# Patient Record
Sex: FEMALE | Race: WHITE | NOT HISPANIC OR LATINO | Employment: PART TIME | URBAN - NONMETROPOLITAN AREA
[De-identification: names, ages, dates, MRNs, and addresses within clinical notes are randomized per-mention and may not be internally consistent; named-entity substitution may affect disease eponyms.]

---

## 2017-09-21 ENCOUNTER — OFFICE VISIT - GICH (OUTPATIENT)
Dept: FAMILY MEDICINE | Facility: OTHER | Age: 27
End: 2017-09-21

## 2017-09-21 ENCOUNTER — HOSPITAL ENCOUNTER (OUTPATIENT)
Dept: RADIOLOGY | Facility: OTHER | Age: 27
End: 2017-09-21
Attending: PHYSICIAN ASSISTANT

## 2017-09-21 ENCOUNTER — HISTORY (OUTPATIENT)
Dept: FAMILY MEDICINE | Facility: OTHER | Age: 27
End: 2017-09-21

## 2017-09-21 DIAGNOSIS — M25.562 PAIN IN LEFT KNEE: ICD-10-CM

## 2017-09-21 DIAGNOSIS — M25.561 PAIN IN RIGHT KNEE: ICD-10-CM

## 2017-09-21 DIAGNOSIS — F17.200 NICOTINE DEPENDENCE, UNCOMPLICATED: ICD-10-CM

## 2017-09-21 DIAGNOSIS — B96.89 OTHER SPECIFIED BACTERIAL AGENTS AS THE CAUSE OF DISEASES CLASSIFIED ELSEWHERE: ICD-10-CM

## 2017-09-21 DIAGNOSIS — N89.8 OTHER SPECIFIED NONINFLAMMATORY DISORDERS OF VAGINA (CODE): ICD-10-CM

## 2017-09-21 DIAGNOSIS — N76.0 ACUTE VAGINITIS: ICD-10-CM

## 2017-09-22 ENCOUNTER — COMMUNICATION - GICH (OUTPATIENT)
Dept: FAMILY MEDICINE | Facility: OTHER | Age: 27
End: 2017-09-22

## 2017-09-26 ENCOUNTER — HOSPITAL ENCOUNTER (OUTPATIENT)
Dept: PHYSICAL THERAPY | Facility: OTHER | Age: 27
Setting detail: THERAPIES SERIES
End: 2017-09-26
Attending: PHYSICIAN ASSISTANT

## 2017-09-26 DIAGNOSIS — M25.561 PAIN IN RIGHT KNEE: ICD-10-CM

## 2017-09-26 DIAGNOSIS — M25.562 PAIN IN LEFT KNEE: ICD-10-CM

## 2017-10-09 ENCOUNTER — HOSPITAL ENCOUNTER (OUTPATIENT)
Dept: PHYSICAL THERAPY | Facility: OTHER | Age: 27
Setting detail: THERAPIES SERIES
End: 2017-10-09
Attending: PHYSICIAN ASSISTANT

## 2017-10-13 ENCOUNTER — HOSPITAL ENCOUNTER (OUTPATIENT)
Dept: PHYSICAL THERAPY | Facility: OTHER | Age: 27
Setting detail: THERAPIES SERIES
End: 2017-10-13
Attending: PHYSICIAN ASSISTANT

## 2017-10-16 ENCOUNTER — HOSPITAL ENCOUNTER (OUTPATIENT)
Dept: PHYSICAL THERAPY | Facility: OTHER | Age: 27
Setting detail: THERAPIES SERIES
End: 2017-10-16
Attending: PHYSICIAN ASSISTANT

## 2017-10-23 ENCOUNTER — HOSPITAL ENCOUNTER (OUTPATIENT)
Dept: PHYSICAL THERAPY | Facility: OTHER | Age: 27
Setting detail: THERAPIES SERIES
End: 2017-10-23
Attending: PHYSICIAN ASSISTANT

## 2017-12-27 NOTE — PROGRESS NOTES
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 medicine that reduces the chance of blood clots, bronchodilators, or diabetes medicines.  If you have diabetes: This medicine may affect your blood sugar level and change the amount of insulin or other diabetes medicines you may need. Talk to your healthcare provider about this.  What are the possible side effects?   Along with its needed effects, your medicine may cause some unwanted side effects. Some side effects may be very serious. Some side effects may go away as your body adjusts to the medicine. Tell your healthcare provider if you have any side effects that continue or get worse.  Life-threatening (Report these to your healthcare provider right away. If you cannot reach your healthcare provider right away, get emergency medical care or call 911 for help): Allergic reaction (hives; itching; rash; trouble breathing; tightness in your chest; swelling of your lips, tongue, and throat).  Serious (Report these to your healthcare provider right away.): Signs of nicotine overdose (pale skin, cold sweats, nausea, increased saliva, vomiting, diarrhea, severe headache, dizziness, confusion, weakness, very slow or very fast heartbeat, irregular heartbeat, trouble  breathing, trouble seeing or hearing, severe nervousness, seizures); jaw or tooth pain, severe mouth pain or irritation; mouth blisters.  Other: Sore mouth or throat, change in sense of taste, belching, gas, mild headache, trouble sleeping, hiccups.  What products might interact with this medicine?   When you take this medicine with other medicines, it can change the way this or any of the other medicines work. Nonprescription medicines, vitamins, natural remedies, and certain foods may also interact. Using these products together might cause harmful side effects. Also, when you stop smoking and start using any nicotine replacement products, the dosage of some medicines you may have been taking may need adjustment. Talk to your healthcare provider if you are taking:     Adenosine (Adenocard)    Alpha blockers such as doxazosin (Cardura), prazosin (Minipress), and terazosin    Antianxiety medicines such as chlordiazepoxide, oxazepam, and diazepam (Valium)    Antiarrhythmic medicines (to treat irregular heartbeat) such as flecainide and mexiletine    Antidepressants such as amitriptyline, desipramine (Norpramin), fluvoxamine (Luvox), imipramine (Tofranil), and nortriptyline (Pamelor)    Antipsychotic medicines such as clozapine (Clozaril, FazaClo) and olanzapine (Zyprexa)    Beta blockers such as acebutolol (Sectral), atenolol (Tenormin), carvedilol (Coreg), labetalol (Trandate), metoprolol (Lopressor, Toprol), nadolol (Corgard), pindolol, and sotalol (Betapace, Sorine)    Bupropion (Aplenzin, Forfivo, Wellbutrin, Buproban, Zyban)    Caffeine in food, drinks, or medicines    Cancer medicines such as erlotinib (Tarceva) and irinotecan (Camptosar)    Cimetidine (Tagamet)    Decongestants such as oxymetazoline (Afrin, Dristan), phenylephrine (Ignacio-Synephrine, Sudafed PE), and pseudoephedrine (Sudafed)    Doxepin (Silenor)    Furosemide (Lasix)    Insulin    Medicines to treat breathing or lung problems such as  aminophylline and theophylline    Migraine medicines such as dihydroergotamine (D.H.E. 45, Migranal), ergotamine (Ergomar), and methylergonovine    Other medicines or products to help you stop smoking    Pentazocine (Talwin)    Propranolol (Hemangeol, Inderal, InnoPran)    Varenicline (Chantix)  If you are not sure if your medicines might interact, ask your pharmacist or healthcare provider. Keep a list of all your medicines with you. List all the prescription medicines, nonprescription medicines, supplements, natural remedies, and vitamins that you take. Be sure that you tell all healthcare providers who treat you about all the products you are taking.   How should I store this medicine?   Store this medicine at room temperature. Protect it from heat, high humidity, and bright light.    This advisory includes selected information only and may not include all side effects of this medicine or interactions with other medicines. Ask your healthcare provider or pharmacist for more information or if you have any questions.  Ask your pharmacist for the best way to dispose of outdated medicine or medicine you have not used. Do not throw medicine in the trash.  Keep all medicines out of the reach of children.   Do not share medicines with other people.   Developed by [employer].  Medication Advisor 2016.3 published by [employer].  Last modified: 2016-04-11  Last reviewed: 2015-03-19  This content is reviewed periodically and is subject to change as new health information becomes available. The information is intended to inform and educate and is not a replacement for medical evaluation, advice, diagnosis or treatment by a healthcare professional.  References   Medication Advisor 2016.3 Index    Copyright   2016 [employer], a division of [employer] All rights reserved.           Greater than 25 minutes were spent in counseling and coordination of care.     Courtney Ascencio PA-C..................9/21/2017  3:34 PM

## 2017-12-28 NOTE — PROGRESS NOTES
"Patient Information     Patient Name MRN Sex Tegan Mayorga [id number] Female 1990      Progress Notes by Yusra Carvalho PT at 10/16/2017  1:00 PM     Author:  Yusra Carvalho PT Service:  (none) Author Type:  PT- Physical Therapist     Filed:  10/16/2017  1:45 PM Date of Service:  10/16/2017  1:00 PM Status:  Signed     :  Yusra Carvalho PT (PT- Physical Therapist)            [facility]  Outpatient PT - Daily note    Date of Service: 10/16/2017   Visit #4    PSFS Visit:  4/10  PSFS Completed:  2017     Patient Name: Tegan Antoine   YOB: 1990   Referring MD/Provider: Courtney Ascencio PA-C  Medical and Treatment Diagnosis: Bilateral knee pain  PT Treatment Diagnosis: bilateral patellofemoral pain, hip weakness  Insurance: Other: IM Care  Start of Service: 17  Certification Dates: Start of Service: 17  Medicare/MA Re-Cert Due: 17         Subjective        Patient states knees are feeling better. Leg raises at home getting easier.     Objective    Today's Intervention:    Bike seat 4, level 5 x5 mins  Treadmill 10% incline; side stepping 10% x3 mins each 0.6-0.7 mph    Leg press seat 16, 140# backrest 3, 2 x15  Abduction 60# 2 x15  hamstring curls 80# seat 9 2 x15  Adduction 70# x5, 60# x10    Standing hip flexion, abduction and extension red theraband resistance x15 bilateral   Standing hip hike off 4\" step to fatigue bilateral    Deferred:  Monster walk with green theraband around ankles to fatigue bilateral directions  kinesiotape promoting lateral patellar glide and patellar lift to bilateral knees  Game ready x10 mins bilateral lower extremities, patient supine with bolster under bilateral knees, low compression and 38 degrees   single leg BOSU balance flat surface x1 min bilateral   Supine bridging to fatigue  Seated hip internal rotation with theraband resistance    Plan for next visit: hip strengthening, work toward step " up/down; hip internal rotation/ external rotation strength    Home Exercise Program:    Access Code: [email or web address] URL: [email or web address] Date: 09/26/2017Prepared by: Yusra Carvalho   Exercises   Clamshell - 10-15 reps - 1-2 sets - 5 seconds hold - 1x daily progressing to abduction straight leg raise   Supine Active Straight Leg Raise - 10-15 reps - 1-2 sets - 5 seconds hold - 1x daily       Assessment    Patient demonstrates early fatigue through gluteals and needs cues to prevent compensation patterns due to hip weakness.    Patient will benefit from continued skilled physical therapy services to address aforementioned impairments and return patient to previous level of functioning.      Completed 9/26/2017:  Patient Specific Functional and Pain Scales (PSFS)  Clinician Instructions: Complete after the history and before the exam.   Initial Assessment:   We want to know what 3 activities in your life you are unable to perform, or are having the most difficulty performing, as a result of your chief problem. Please list and score at least 3 activities that you are unable to perform, or having the most difficulty performing, because of your chief problem.   Patient Specific Activity Scoring Scheme (score one number for each activity):   Activity Score (0-10)  0= Unable to perform activity  10= Able to perform activity at same level as before injury or problem   1. Stairs up> down 4/10   2. Kneeling/ pressure on the knee 3/10   3. Getting in/out vehicle 5/10   4.  /10   5.  /10   Totals:  12/30= 40% ability, 60% impairment   Patient verbally states that they understand that the information they have provided above is current and complete to the best of their knowledge.   Patient Specific Functional Scale Modifier Scale Conversion: (patient's modifier that correlates with pt's score on PSFS): 4-CL (60% Impaired).  Initial Primary G Code and Modifier:    Per the Patient's intake and/or assessment the Primary  G Code is: Mobility .   The Patient's Impairment, Limitation or Restriction Modifier would be best described as: CK - 40% - 60% Impairment.   Goal Primary G Code and Modifier:    The Patient's G Code Goal would be: Mobility    The Patient's Impairment, Limitation or Restriction Modifier goal would be best described as: CI - 1% - 20% Impairment.       Goals:  Patient goal:  Decrease knee pain in 8 weeks.    Functional Short Term Goals (4 weeks):   Patient will have improved hip strength to allow straight leg raise without compensation patterns.  Patient will be independent with home exercise program       Functional Long Term Goals (8 weeks):   Patient will negotiate 1 flight of stairs reciprocally with less than 3/10 pain consistently.  Patient will improve hip MMT by at least a half a grade for proper hip and knee mechanics with squatting and lunging.        Plan    Treatment Plan:      Frequency:   16 visits    Duration of Treatment: 8 weeks    Planned Interventions:    Home Exercise Program development  Therapeutic Exercise (ROM & Strengthening)  Therapeutic Activities  Neuromuscular Re-education  Manual Therapy  Ultrasound  E-Stim  Gait Training  Hot Packs / Cold Pack Modalities  Vasopneumatic Compression with Cold Therapy ( Game Ready )  Phonophoresis with Ketoprofen  Iontophoresis with Dexamethasone    Plan for next visit:  Hip strengthening, squatting mechanics, stair analysis    Thank you for your referral to [facility].  Please call with any questions, concerns or comments.  [phone]

## 2017-12-28 NOTE — PROGRESS NOTES
Patient Information     Patient Name MRN Sex Tegan Mayorga [id number] Female 1990      Progress Notes by Yusra Carvalho PT at 10/23/2017  1:04 PM     Author:  Yusra Carvalho PT Service:  (none) Author Type:  PT- Physical Therapist     Filed:  10/23/2017  3:02 PM Date of Service:  10/23/2017  1:04 PM Status:  Signed     :  Yusra Carvalho PT (PT- Physical Therapist)            [facility]  Outpatient PT - Daily note    Date of Service: 10/23/2017   Visit #5    PSFS Visit:  5/10  PSFS Completed:  2017     Patient Name: Tegan Antoine   YOB: 1990   Referring MD/Provider: Courtney Ascencio PA-C  Medical and Treatment Diagnosis: Bilateral knee pain  PT Treatment Diagnosis: bilateral patellofemoral pain, hip weakness  Insurance: Other: IM Care  Start of Service: 17  Certification Dates: Start of Service: 17  Medicare/MA Re-Cert Due: 17         Subjective        Patient states knee pain is a 1/10 today. Feels stiff today. Hasn't been very good about home exercises lately.     Objective    Today's Intervention:    Bike seat 4, level 5 x5 mins- deferred, bike in use  Treadmill 10% incline; side stepping 10% x3 mins each 0.6-0.7 mph    Side lying clams, progressing to straight leg raise in hip abduction bilateral x15    Leg press seat 16, 140# backrest 3, 2 x15- DC due to pain in right quad insertion today.  Abduction 60# 2 x15    Prone hip flexor and quad stretching  Prone hip extension to fatigue bilateral     Supine hamstring and hip stretching. Patellar mobilization all directions x5 mins total    single leg balance x1 min bilateral *    gastroc stretches x30 sec on wedge    Monster walk with green theraband around ankles to fatigue bilateral directions  single leg BOSU balance flat surface x1 min bilateral     Deferred:  Seated hip internal rotation with theraband resistance  kinesiotape promoting lateral patellar glide and patellar lift to  "bilateral knees  Game ready x10 mins bilateral lower extremities, patient supine with bolster under bilateral knees, low compression and 38 degrees   hamstring curls 80# seat 9 2 x15  Adduction 70# x5, 60# x10  Supine bridging to fatigue  Standing hip hike off 4\" step to fatigue bilateral    Plan for next visit: hip strengthening, work toward step up/down; hip internal rotation/ external rotation strength    Home Exercise Program:    Access Code: [email or web address] URL: [email or web address] Date: 10/23/2017 Prepared by: Yusra Carvalho   Exercises   Clamshell - 10-15 reps - 1-2 sets - 5 seconds hold - 1x daily   Sidelying Hip Abduction - 10-15 reps - 1-2 sets - 5 seconds hold - 1x daily   Supine Active Straight Leg Raise - 10-15 reps - 1-2 sets - 5 seconds hold - 1x daily   Standing Hip Abduction with Anchored Resistance - 10-15 reps - 1-2 sets - 5 seconds hold - 1x daily   Standing Hip Extension with Anchored Resistance - 10-15 reps - 1-2 sets - 5 seconds hold - 1x daily   Standing Hip Flexion with Anchored Resistance and Chair Support - 10-15 reps - 1-2 sets - 5 seconds hold - 1x daily   single leg balance x1 min      Assessment    Patient demonstrates early fatigue through gluteals and needs cues to prevent compensation patterns due to hip weakness.    Patient will benefit from continued skilled physical therapy services to address aforementioned impairments and return patient to previous level of functioning.      Completed 9/26/2017:  Patient Specific Functional and Pain Scales (PSFS)  Clinician Instructions: Complete after the history and before the exam.   Initial Assessment:   We want to know what 3 activities in your life you are unable to perform, or are having the most difficulty performing, as a result of your chief problem. Please list and score at least 3 activities that you are unable to perform, or having the most difficulty performing, because of your chief problem.   Patient Specific Activity " Scoring Scheme (score one number for each activity):   Activity Score (0-10)  0= Unable to perform activity  10= Able to perform activity at same level as before injury or problem   1. Stairs up> down 4/10   2. Kneeling/ pressure on the knee 3/10   3. Getting in/out vehicle 5/10   4.  /10   5.  /10   Totals:  12/30= 40% ability, 60% impairment   Patient verbally states that they understand that the information they have provided above is current and complete to the best of their knowledge.   Patient Specific Functional Scale Modifier Scale Conversion: (patient's modifier that correlates with pt's score on PSFS): 4-CL (60% Impaired).  Initial Primary G Code and Modifier:    Per the Patient's intake and/or assessment the Primary G Code is: Mobility .   The Patient's Impairment, Limitation or Restriction Modifier would be best described as: CK - 40% - 60% Impairment.   Goal Primary G Code and Modifier:    The Patient's G Code Goal would be: Mobility    The Patient's Impairment, Limitation or Restriction Modifier goal would be best described as: CI - 1% - 20% Impairment.       Goals:  Patient goal:  Decrease knee pain in 8 weeks.    Functional Short Term Goals (4 weeks):   Patient will have improved hip strength to allow straight leg raise without compensation patterns.  Patient will be independent with home exercise program       Functional Long Term Goals (8 weeks):   Patient will negotiate 1 flight of stairs reciprocally with less than 3/10 pain consistently.  Patient will improve hip MMT by at least a half a grade for proper hip and knee mechanics with squatting and lunging.        Plan    Treatment Plan:      Frequency:   16 visits    Duration of Treatment: 8 weeks    Planned Interventions:    Home Exercise Program development  Therapeutic Exercise (ROM & Strengthening)  Therapeutic Activities  Neuromuscular Re-education  Manual Therapy  Ultrasound  E-Stim  Gait Training  Hot Packs / Cold Pack  Modalities  Vasopneumatic Compression with Cold Therapy ( Game Ready )  Phonophoresis with Ketoprofen  Iontophoresis with Dexamethasone    Plan for next visit:  Hip strengthening, squatting mechanics, stair analysis    Thank you for your referral to [facility].  Please call with any questions, concerns or comments.  [phone]

## 2017-12-28 NOTE — PROGRESS NOTES
"Patient Information     Patient Name MRN Sex Tegan Mayorga [id number] Female 1990      Progress Notes by Yusra Carvalho, PT at 10/13/2017  1:55 PM     Author:  Yusra Carvalho PT Service:  (none) Author Type:  PT- Physical Therapist     Filed:  10/13/2017  2:41 PM Date of Service:  10/13/2017  1:55 PM Status:  Signed     :  Yusra Carvalho PT (PT- Physical Therapist)            [facility]  Outpatient PT - Daily note    Date of Service: 10/13/2017   Visit #3    PSFS Visit:  3/10  PSFS Completed:  2017     Patient Name: Tegan nAtoine   YOB: 1990   Referring MD/Provider: Courtney Ascencio PA-C  Medical and Treatment Diagnosis: Bilateral knee pain  PT Treatment Diagnosis: bilateral patellofemoral pain, hip weakness  Insurance: Other: IM Care  Start of Service: 17  Certification Dates: Start of Service: 17  Medicare/MA Re-Cert Due: 17         Subjective        Rates knee pain \"low\" today, like a 1/10.     Objective    Today's Intervention:    Bike seat 4, level 5 x5 mins  Treadmill 10% incline; side stepping 10% x2.5 mins each 0.6-0.7 mph    Leg press seat 16, 140# backrest 3 2 x15  Abduction 60# 2 x15  hamstring curls 80# seat 9 2 x15    Standing hip hike off 4\" step to fatigue bilateral  single leg BOSU balance flat surface x1 min bilateral     Side lying clams x5, straight leg raise abduction with cues for glut med x15 bilateral     Supine bridging to fatigue    Deferred:  Monster walk with green theraband around ankles to fatigue bilateral directions  kinesiotape promoting lateral patellar glide and patellar lift to bilateral knees  Game ready x10 mins bilateral lower extremities, patient supine with bolster under bilateral knees, low compression and 38 degrees     Plan for next visit: hip strengthening, work toward step up/down; hip internal rotation/ external rotation strength    Home Exercise Program:    Access Code: [email or web address] URL: " [email or web address] Date: 09/26/2017Prepared by: Yusra Carvalho   Exercises   Clamshell - 10-15 reps - 1-2 sets - 5 seconds hold - 1x daily progressing to abduction straight leg raise   Supine Active Straight Leg Raise - 10-15 reps - 1-2 sets - 5 seconds hold - 1x daily       Assessment    Patient demonstrates early fatigue through gluteals and knee pain improves with cues preventing valgus positioning.    Patient will benefit from continued skilled physical therapy services to address aforementioned impairments and return patient to previous level of functioning.      Completed 9/26/2017:  Patient Specific Functional and Pain Scales (PSFS)  Clinician Instructions: Complete after the history and before the exam.   Initial Assessment:   We want to know what 3 activities in your life you are unable to perform, or are having the most difficulty performing, as a result of your chief problem. Please list and score at least 3 activities that you are unable to perform, or having the most difficulty performing, because of your chief problem.   Patient Specific Activity Scoring Scheme (score one number for each activity):   Activity Score (0-10)  0= Unable to perform activity  10= Able to perform activity at same level as before injury or problem   1. Stairs up> down 4/10   2. Kneeling/ pressure on the knee 3/10   3. Getting in/out vehicle 5/10   4.  /10   5.  /10   Totals:  12/30= 40% ability, 60% impairment   Patient verbally states that they understand that the information they have provided above is current and complete to the best of their knowledge.   Patient Specific Functional Scale Modifier Scale Conversion: (patient's modifier that correlates with pt's score on PSFS): 4-CL (60% Impaired).  Initial Primary G Code and Modifier:    Per the Patient's intake and/or assessment the Primary G Code is: Mobility .   The Patient's Impairment, Limitation or Restriction Modifier would be best described as: CK  - 40% - 60% Impairment.   Goal Primary G Code and Modifier:    The Patient's G Code Goal would be: Mobility    The Patient's Impairment, Limitation or Restriction Modifier goal would be best described as: CI - 1% - 20% Impairment.       Goals:  Patient goal:  Decrease knee pain in 8 weeks.    Functional Short Term Goals (4 weeks):   Patient will have improved hip strength to allow straight leg raise without compensation patterns.  Patient will be independent with home exercise program       Functional Long Term Goals (8 weeks):   Patient will negotiate 1 flight of stairs reciprocally with less than 3/10 pain consistently.  Patient will improve hip MMT by at least a half a grade for proper hip and knee mechanics with squatting and lunging.        Plan    Treatment Plan:      Frequency:   16 visits    Duration of Treatment: 8 weeks    Planned Interventions:    Home Exercise Program development  Therapeutic Exercise (ROM & Strengthening)  Therapeutic Activities  Neuromuscular Re-education  Manual Therapy  Ultrasound  E-Stim  Gait Training  Hot Packs / Cold Pack Modalities  Vasopneumatic Compression with Cold Therapy ( Game Ready )  Phonophoresis with Ketoprofen  Iontophoresis with Dexamethasone    Plan for next visit:  Hip strengthening, squatting mechanics, stair analysis    Thank you for your referral to [facility].  Please call with any questions, concerns or comments.  [phone]

## 2017-12-28 NOTE — TELEPHONE ENCOUNTER
Patient Information     Patient Name MRN Sex Tegan Mayorga [id number] Female 1990      Telephone Encounter by Mai Pacheco at 2017 12:48 PM     Author:  Mai Pacheco Service:  (none) Author Type:  (none)     Filed:  2017 12:50 PM Encounter Date:  2017 Status:  Signed     :  Mai Pacheco            Pt called because she missed a call from the clinic. I looked through result notes, and telephone calls and did not see who called the pt. Did you call the pt?

## 2017-12-28 NOTE — TELEPHONE ENCOUNTER
Patient Information     Patient Name MRN Sex Tegan Mayorga [id number] Female 1990      Telephone Encounter by Brionna Vital at 2017  4:10 PM     Author:  Brionna Vital Service:  (none) Author Type:  (none)     Filed:  2017  4:11 PM Encounter Date:  2017 Status:  Signed     :  Brionna Vital            After the patient's name and date of birth was verified, the patient was told the below information.  Brionna Vital LPADELIA..................2017   4:11 PM

## 2017-12-28 NOTE — PROGRESS NOTES
Patient Information     Patient Name MRN Sex Tegan Mayorga [id number] Female 1990      Progress Notes by Yusra Carvalho PT at 10/9/2017  1:06 PM     Author:  Yusra Carvalho PT Service:  (none) Author Type:  PT- Physical Therapist     Filed:  10/9/2017  4:12 PM Date of Service:  10/9/2017  1:06 PM Status:  Signed     :  Yusra Carvalho PT (PT- Physical Therapist)            [facility]  Outpatient PT - Daily note    Date of Service: 10/9/2017   Visit #2    PSFS Visit:  2/10  PSFS Completed:  2017     Patient Name: Tegan Antoine   YOB: 1990   Referring MD/Provider: Courtney Ascencio PA-C  Medical and Treatment Diagnosis: Bilateral knee pain  PT Treatment Diagnosis: bilateral patellofemoral pain, hip weakness  Insurance: Other: [employer]  Start of Service: 17  Certification Dates: Start of Service: 17  Medicare/MA Re-Cert Due: 17         Subjective        Knees are feeling better and tape seemed to help. Rates pain 1-2/10 today.  Works out at [employer] 2x/week (Tues and Th)    Objective    ROM / Strength:                 Norms Left  Right Strength Left  Right   Hip Flexion 120    Hip Flexion 5- 5-   Hip Extension 15   Hip Extension 4+ 4+   Hip Abduction 50   Hip Abduction 4- 4-   Hip External Rot. 60   Hip External Rot.     Hip Internal Rot. 40   Hip Internal Rot.     Knee Extension 0   Knee Extension 5- 5-   Knee Flexion 135   Knee Flexion 4+ 5-   Dorsiflexion 20   Dorsiflexion 5 5   Plantarflexion    Plantarflexion       Observation:  Patient compensates for glut and hip weakness with TFL and QL during side lying clams and straight leg raises    Palpation:  Tender with patellar mobilization and inferolateral border of patella    Gait:  normal    Special Tests:  Patellar compression positive. Worse with medial glide.  Forward knees and valgus with squatting and lunges. Lacks glut firing    Today's Intervention:    Bike seat 4, level 5 x5  "mins  Treadmill 10% incline, retro x3.5 mins; side stepping 10% x2.5 mins each    Wall squats with theraball behind, green theraband around knees to promote lateral patellar tracking. x15  Step ups retro to 4\" step x15 bilateral   Monster walk with green theraband around ankles to fatigue bilateral directions    Side lying clams x5, straight leg raise abduction with cues for glut med x15 bilateral     kinesiotape promoting lateral patellar glide and patellar lift to bilateral knees   Patient was instructed in care of tape. Instructed to remove tape if skin becomes red, itchy, or any other adverse reaction occurs, and wash with soap and water. Tape can be left on for 2-4 days as tolerated and can be worn in the shower. Instructed to gently towel-dry over tape and to never use a hair dryer on tape. Recommended to remove tape the night before, or the morning of, the next physical therapy appointment to allow skin rest time. Patient voiced understanding.    Game ready x10 mins bilateral lower extremities, patient supine with bolster under bilateral knees, low compression and 38 degrees     Plan for next visit: Standing hip abduction/ hip hike/ single leg; [facility]    Home Exercise Program:    Access Code: [email or web address] URL: [email or web address] Date: 09/26/2017Prepared by: Yusra Carvalho   Exercises   Clamshell - 10-15 reps - 1-2 sets - 5 seconds hold - 1x daily   Supine Active Straight Leg Raise - 10-15 reps - 1-2 sets - 5 seconds hold - 1x daily       Assessment    Patient demonstrates tendency to walk/ squat with hips internally rotated and fatigues quickly during session through abductors. Improved knee pain with cues for squatting technique including sitting hips further back and nose over toes.    Patient will benefit from continued skilled physical therapy services to address aforementioned impairments and return patient to previous level of functioning.      Completed 9/26/2017:  Patient Specific Functional " and Pain Scales (PSFS)  Clinician Instructions: Complete after the history and before the exam.   Initial Assessment:   We want to know what 3 activities in your life you are unable to perform, or are having the most difficulty performing, as a result of your chief problem. Please list and score at least 3 activities that you are unable to perform, or having the most difficulty performing, because of your chief problem.   Patient Specific Activity Scoring Scheme (score one number for each activity):   Activity Score (0-10)  0= Unable to perform activity  10= Able to perform activity at same level as before injury or problem   1. Stairs up> down 4/10   2. Kneeling/ pressure on the knee 3/10   3. Getting in/out vehicle 5/10   4.  /10   5.  /10   Totals:  12/30= 40% ability, 60% impairment   Patient verbally states that they understand that the information they have provided above is current and complete to the best of their knowledge.   Patient Specific Functional Scale Modifier Scale Conversion: (patient's modifier that correlates with pt's score on PSFS): 4-CL (60% Impaired).  Initial Primary G Code and Modifier:    Per the Patient's intake and/or assessment the Primary G Code is: Mobility .   The Patient's Impairment, Limitation or Restriction Modifier would be best described as: CK - 40% - 60% Impairment.   Goal Primary G Code and Modifier:    The Patient's G Code Goal would be: Mobility    The Patient's Impairment, Limitation or Restriction Modifier goal would be best described as: CI - 1% - 20% Impairment.       Goals:  Patient goal:  Decrease knee pain in 8 weeks.    Functional Short Term Goals (4 weeks):   Patient will have improved hip strength to allow straight leg raise without compensation patterns.  Patient will be independent with home exercise program       Functional Long Term Goals (8 weeks):   Patient will negotiate 1 flight of stairs reciprocally with less than 3/10 pain  consistently.  Patient will improve hip MMT by at least a half a grade for proper hip and knee mechanics with squatting and lunging.        Plan    Treatment Plan:      Frequency:   16 visits    Duration of Treatment: 8 weeks    Planned Interventions:    Home Exercise Program development  Therapeutic Exercise (ROM & Strengthening)  Therapeutic Activities  Neuromuscular Re-education  Manual Therapy  Ultrasound  E-Stim  Gait Training  Hot Packs / Cold Pack Modalities  Vasopneumatic Compression with Cold Therapy ( Game Ready )  Phonophoresis with Ketoprofen  Iontophoresis with Dexamethasone    Plan for next visit:  Hip strengthening, squatting mechanics, stair analysis    Thank you for your referral to [facility].  Please call with any questions, concerns or comments.  [phone]

## 2017-12-28 NOTE — TELEPHONE ENCOUNTER
Patient Information     Patient Name MRN Tegan Larry [id number] Female 1990      Telephone Encounter by Courtney Ascencio PA-C at 2017  4:08 PM     Author:  Courtney Ascencio PA-C Service:  (none) Author Type:  PHYS- Physician Assistant     Filed:  2017  4:09 PM Encounter Date:  2017 Status:  Signed     :  Courtney Ascencio PA-C (PHYS- Physician Assistant)            No.  She has nicorette gum and metronidazole to treat her bacterial vaginosis at the pharmacy.  Courtney Ascencio PA-C ....................  2017   4:08 PM

## 2017-12-29 NOTE — PATIENT INSTRUCTIONS
Patient Information     Patient Name MRN Tegan Larry [id number] Female 1990      Patient Instructions by Courtney Ascencio PA-C at 2017  4:20 PM     Author:  Courtney Ascencio PA-C  Service:  (none) Author Type:  PHYS- Physician Assistant     Filed:  2017  4:20 PM  Encounter Date:  2017 Status:  Addendum     :  Courtney Ascencio PA-C (PHYS- Physician Assistant)        Related Notes: Original Note by Courtney Ascencio PA-C (PHYS- Physician Assistant) filed at 2017  4:17 PM            Bacterial vaginosis - Given metronidazole for treatment.  Do NOT drink alcohol while taking the medication.  Return in 1-2 weeks with persistent symptoms after treatment as needed.    Knee pain:   Encouraged to take ibuprofen for relief up to 4 times per day.  Encouraged rest and elevation.  Encouraged to use ice or heat 15 minutes at a time several times per day to decrease pain. Return to clinic in 1-2 weeks as necessary for persistent pain. Return to clinic with any change or worsening of symptoms.  Referred to physical therapy.        Index [place] Related topics   Vaginal Bacteria Overgrowth (Bacterial Vaginosis)   ________________________________________________________________________  KEY POINTS    Bacterial vaginosis (BV) is an overgrowth of a certain type of bacteria in the vagina (birth canal). It is a common condition that may or may not cause symptoms.    Untreated BV may go away on its own. If BV is causing itching, pain, or other problems, you may need antibiotic medicine.    Ask your healthcare provider if there are activities you should avoid and when you can return to your normal activities.  ________________________________________________________________________  What is bacterial vaginosis?  Bacterial vaginosis (BV) is an overgrowth of a certain type of bacteria in the vagina (birth canal). It is a common condition that may or may not cause symptoms.   What is the  cause?  It s normal to have some bacteria in the vagina, but sometimes there are too many of certain types of bacteria. Doctors don t know what causes this imbalance of bacteria. One possible cause is douching (cleaning out the vagina with water or other fluids).  Most cases of bacterial vaginosis happen in sexually active women. Women who have more than 1 sexual partner have a greater risk of this problem. However, women who are not sexually active can also have vaginosis. Smoking cigarettes also increases the risk.  What are the symptoms?  Many women don t have any symptoms. When women do have symptoms, the most common symptom is a discharge from the vagina. The discharge may be gray or yellowish and smell bad. For example, it may smell fishy, especially after sex. You may also have itching around the opening of the vagina. Sometimes BV can cause pain or burning in the vagina that does not go away.  How is it diagnosed?  Your healthcare provider will ask about your symptoms and medical history. You will have a pelvic exam, and your provider will get a sample of vaginal discharge for lab tests.  How is it treated?   Untreated bacterial vaginosis sometimes goes away on its own. Sometimes, if you scratch the area to relieve itching, you may get an infection. Rarely, it may cause vaginal pain that keeps bothering you. If bacterial vaginosis is causing itching, pain, or other problems, it may need to be treated with antibiotics. The medicine for bacterial vaginosis may be taken by mouth or it may be a cream or gel that you put into the vagina. The symptoms usually go away within a few days after you start treatment.   How can I take care of myself?  Follow your healthcare provider's instructions. Ask your provider:    How and when you will get your test results    How long it will take to recover    If there are activities you should avoid and when you can return to your normal activities    How to take care of  yourself at home    What symptoms or problems you should watch for and what to do if you have them  Make sure you know when you should come back for a checkup. Keep all appointments for provider visits or tests.  How can I help prevent bacterial vaginosis?    Use latex or polyurethane condoms during foreplay and every time you have vaginal, oral, or anal sex.    Have just 1 sexual partner who is not having sex with anyone else.    If you have had sex and are worried that you may have been infected, see your healthcare provider even if you don t have any symptoms.    Do not douche.  Developed by [employer].  Adult Advisor 2016.3 published by [employer].  Last modified: 2016-03-23  Last reviewed: 2015-11-02  This content is reviewed periodically and is subject to change as new health information becomes available. The information is intended to inform and educate and is not a replacement for medical evaluation, advice, diagnosis or treatment by a healthcare professional.  References   Adult Advisor 2016.3 Index    Copyright   2016 [employer], a division of [employer] All rights reserved.       [facility]: Quitting Tobacco  Thousands of People Have Kicked the Habit and With a Little Motivation and Planning. So Can You!     Congratulations!  Reading this information is the first step to stopping tobacco use because it shows you re ready to consider quitting.  Are You Kidding Yourself?  I don t smoke enough to get the diseases that smoking causes.  Smoking even one cigarette each day results in:           an increased heart rate, blood pressure and narrowing of major blood vessels, causing the heart to work harder           a decreased oxygen supply in the bloodstream, resulting in shortness of breath and lack of oxygen           faster blood clotting, resulting in an increased risk of heart attack, stroke and circulatory problems           increased risk of emphysema.     I don t have to worry  about my health. I smoke low-tar or  organic  cigarettes.  There are no  safe cigarettes.  Low-tar cigarettes often produce higher levels of chemicals like carbon monoxide than high-tar cigarettes. Even organic cigarettes contain toxic chemicals that are part of the tobacco leaf.  Smoking relaxes me.  Smoking is just a temporary relief from the tension caused by your need for nicotine. Smoking actually increases your heart rate and blood pressure.  I ve smoked so long that it won t make any difference if I quit now.  Wrong. Research has proven that your body benefits from quitting, no matter how long you ve been smoking. Your body can even repair some of the damage that has been done. The extra oxygen also helps your body heal faster from injuries or illness.  It s too hard for me to stop smoking.  About 1.5 million Americans quit smoking each year. Each day you do not smoke, your desire will become less and finally disappear.  Preparing To Quit           Make a pact with yourself to quit.           Instead of looking at quitting as success or failure, remember that every effort to quit is another practice at living your life without cigarettes.           Write down your three most important reasons for quitting on a card. Carry the card with you or post it on your refrigerator, desk or mirror and look at it several times a day.           Start reducing your smoking by using distractions or other coping methods.           Making your home smoke free can start reducing your triggers to smoke. For example, most people don t expect to smoke in a restaurant anymore because they ve grown used to it.           Visualize yourself as someone who doesn t use tobacco.           Plan your reward for each day you don t smoke. Keep them small, easy, and affordable. And above all -- do them!  Actually Quitting           Get rid of all cigarettes and chewing tobacco.           Throw away your ashtrays and lighters.           Don t  allow smoking in your home -- if other family members want to smoke, ask them to go outside.           Avoid  high risk  situations for a while: bars, parties and smoking environments.           Think positively. Believe you can quit and your body will remember what it was like before you started using tobacco. Your body was born to live without tobacco.           Take it one day at a time. If you fall off the wagon, climb back on! Remember that most cravings last only 3 to 5 minutes   so wait it out! The craving will go away whether you smoke or not!  Quitting Aids  Talk with your doctor about which way(s) to quit may help you the most.           Over-the-counter products include nicotine gum, nicotine lozenge and the nicotine patch.           Prescription products include Chantix , Zyban , nicotine nasal spray, nicotine inhaler.  Quitting Without Aids  Other ways to quit include hypnosis, acupuncture, delaying the first cigarette of the day, cutting down, and quitting cold turkey.  Need Help?           Call [employer] Class Registration at [phone] or visit [email or web address] to learn about support groups.           Call [employer]  Services at [phone] or visit [employer].    2016 [employer]. TM - A TRADEMARK OF [employer]  OTHER TRADEMARKS USED ARE OWNED BY THEIR RESPECTIVE OWNERS  THIS BROCHURE DOES NOT REPLACE MEDICAL OR PROFESSIONAL ADVICE; IT IS ONLY A GUIDE  [id number] (2/16)   [employer]: Tips and Coping Skills for Quitting Smoking     Getting Started           Make a list of reasons for quitting.           Think positively.  -   Believe you can.  -   Remind yourself,  I don t do that anymore.   -   Tell yourself often:  I can do this.   -   Visualize yourself as someone who doesn t use tobacco.           Use relaxation breathing.  -   Inhale to count of eight.  -   Hold to count of four.  -   Exhale to count of eight.           Substitute items for  cigarettes.  -   Chew gum.  -   Suck on hard candy.  -   Chew on straws or toothpicks.  -   Eat low-calorie snacks.           Keep your hands busy.  -   Play cards.  -   Read books.  -   Put together puzzles.  -   Play with rubber binders.  -   Make crafts.  -   Write letters.  -   Draw.  -   Paint.           Concentrate on the good things in your life!           Change your environment:  -   Change your routine to help avoid temptation. Even small changes can lower the craving to smoke.  -   Get rid of all cigarettes, ashtrays and lighters in your home, car, desk or office.  -   Change your favorite smoking areas to make them remind you less of smoking.  -   Make your home and vehicles smoke free.           Get support from others:  -   Talk to your family, friends or coworkers about how to support you while you quit.  -   See if others you know would like to quit with you. This way you can support each other through the tougher times of quitting.           Plan your reward for each day you don t smoke.           Remember that even the most intense craving lasts only 5 to 10 minutes. Wait it out. Tell yourself,  This too shall pass.   Avoiding a Relapse           Have a plan for how you will deal with unexpected urges. (Take a walk, make a call.)           Think your way through difficult situations ahead of time whenever you can.           Think about past quitting attempts and what was helpful to you. Reuse them again if possible or try something new.           Explore ways to move your body with safe and realistic expectations. Increasing your physical activity can help you manage weight gain and work through emotions that otherwise would make you want to smoke.           Avoid foods high in calories and fat. Sugar can increase cravings to smoke.  Limit large amounts of sugar.           Drink lots of water. Ice water may be helpful in getting rid of a craving.           Reward yourself when you reach milestones:  1 day, 1 week, 2 weeks, 1 month, etc.           Go to places where you cannot smoke -- stay away from the places you used to smoke.           Think about the money you saved!           Think of quitting as an act of love -- for those you care about and for yourself!  Quitting Smoking  For help to quit smoking:           Call [employer] Class Registration at [phone] or visit [email or web address] to learn about support groups.           Call [employer]  Services at [phone] or visit [employer].       2016 [employer]. TM - A TRADEMARK OF [employer]  OTHER TRADEMARKS USED ARE OWNED BY THEIR RESPECTIVE OWNERS  THIS FACT SHEET DOES NOT REPLACE MEDICAL OR PROFESSIONAL ADVICE; IT IS ONLY A GUIDE.  [id number] (2/16)       Index Citizen of [place]   Nicotine, Oral   iván-oh-TEEN  ________________________________________________________________________  KEY POINTS    This medicine is a chewing gum used to help you stop smoking. Use it exactly as directed.    This medicine may cause unwanted side effects. Tell your healthcare provider if you have any side effects that are serious, continue, or get worse.    Tell all healthcare providers who treat you about all the prescription medicines, nonprescription medicines, supplements, natural remedies, and vitamins that you take.  ________________________________________________________________________  What are other names for this medicine?   Type of medicine: smoking deterrent  Generic and brand names: nicotine polacrilex, gum; NICOrelief; Nicorette Gum  What is this medicine used for?  This medicine is a chewing gum used to help you stop smoking. The nicotine in the gum is absorbed into the body through skin in the mouth and lessens the urge to smoke.  This medicine may be used to treat other conditions as determined by your healthcare provider.  What should my healthcare provider know before I take this medicine?   Before taking this medicine, tell your  healthcare provider if you have ever had:    An allergic reaction to any medicine    Asthma or other breathing problems that you take medicine to control    Dental problems, especially with your gums or jaws    Depression that you take medicine to control    Diabetes    Gastric reflux disease (GERD)    Heart disease or a recent heart attack    High blood pressure    Liver or kidney disease    Pheochromocytoma (tumor of the adrenal gland)    Seizures    Thyroid problems    Ulcers  Tell your healthcare provider if you continue to smoke, chew tobacco, use snuff, or use other products that contain nicotine. Also, tell your healthcare provider if you are on a low-sodium diet.  Females of childbearing age: Tell your healthcare provider if you are pregnant or breast-feeding. Nicotine from any source can harm the baby. Do not become pregnant while using this medicine. If you become pregnant, contact your healthcare provider. This treatment is not recommended during pregnancy or while breast-feeding. If you are pregnant or breast-feeding, do not use this medicine unless your healthcare provider approves. Smoking can seriously harm your child. Try to stop smoking without using any nicotine replacement medicine.  How do I use it?   If you have tried to stop smoking using another form of nicotine replacement therapy and have not had any success, ask your healthcare provider if this medicine is right for you. Take this medicine exactly as your healthcare provider prescribes. If your healthcare provider has not given you specific instructions, follow the directions that come with the medicine package. Do not take more or take it longer than prescribed. Ask your healthcare provider or pharmacist about anything you do not understand.   Begin using this medicine on your quit day. Check the label on the medicine for directions about your specific dose. This medicine comes in 2 strengths. The dose is based upon how soon you would  normally smoke after waking up in the morning.   Check with your healthcare provider before using this medicine in children under age 18.   When you feel like smoking, chew 1 piece of gum slowly until you feel a slight tingling sensation or peppery taste in your mouth. Stop chewing and place the gum between your teeth and cheek until the tingling is gone. Then chew the gum again until the tingling comes back. Start and stop in this way for about 30 minutes, until most of the tingling is gone. You must chew slowly to get the full benefit from this medicine. If you still feel a craving to smoke even after chewing a piece of gum, you may chew a second piece within the hour. Do not chew more than 2 pieces in a row, since this may cause more side effects such as hiccups, heartburn, nausea, or other side effects.   Do not use more than 24 pieces of gum in 1 day, or as instructed by your healthcare provider.  Do not eat or drink for 15 minutes before using the gum, or while you are chewing it. Food and drink can keep the nicotine from being absorbed.  If the gum sticks to your dentures, ask your healthcare provider or dentist what to do.  If you get blisters in your mouth, stop using the medicine and contact your healthcare provider.  What if I overdose?  If you or anyone else has intentionally taken too much of this medicine, call 911 or go to the emergency room right away. If you pass out, have seizures, weakness or confusion, or have trouble breathing, call 911. If you think that you or anyone else may have taken too much of this medicine, call the poison control center. Do this even if there are no signs of discomfort or poisoning. The poison control center number is [phone].  Symptoms of an acute overdose may include: nausea, vomiting, diarrhea, stomach pain, cold sweat, headache, dizziness, trouble seeing or hearing, confusion, restlessness, fast or irregular heartbeat, chest pain, drooling, seizures,  coma.  What should I watch out for?   Caution: Nicotine can poison children and pets. Keep used and unused nicotine in a safe place out of the reach of children. Wrap used pieces of gum in paper and dispose of the medicine safely.  Stop using this medicine and contact your healthcare provider if you have:    Mouth, teeth or jaw problems    Fast or irregular heartbeat    Symptoms of nicotine overdose such as nausea, vomiting, dizziness, diarrhea, weakness, and fast heartbeat  If you need emergency care, surgery, lab tests, or dental work, tell the healthcare provider or dentist you are using this medicine.  When you stop smoking, there may be a change in how certain medicines work for you. Talk to your healthcare provider if you are taking medicine that reduces the chance of blood clots, bronchodilators, or diabetes medicines.  If you have diabetes: This medicine may affect your blood sugar level and change the amount of insulin or other diabetes medicines you may need. Talk to your healthcare provider about this.  What are the possible side effects?   Along with its needed effects, your medicine may cause some unwanted side effects. Some side effects may be very serious. Some side effects may go away as your body adjusts to the medicine. Tell your healthcare provider if you have any side effects that continue or get worse.  Life-threatening (Report these to your healthcare provider right away. If you cannot reach your healthcare provider right away, get emergency medical care or call 911 for help): Allergic reaction (hives; itching; rash; trouble breathing; tightness in your chest; swelling of your lips, tongue, and throat).  Serious (Report these to your healthcare provider right away.): Signs of nicotine overdose (pale skin, cold sweats, nausea, increased saliva, vomiting, diarrhea, severe headache, dizziness, confusion, weakness, very slow or very fast heartbeat, irregular heartbeat, trouble breathing, trouble  seeing or hearing, severe nervousness, seizures); jaw or tooth pain, severe mouth pain or irritation; mouth blisters.  Other: Sore mouth or throat, change in sense of taste, belching, gas, mild headache, trouble sleeping, hiccups.  What products might interact with this medicine?   When you take this medicine with other medicines, it can change the way this or any of the other medicines work. Nonprescription medicines, vitamins, natural remedies, and certain foods may also interact. Using these products together might cause harmful side effects. Also, when you stop smoking and start using any nicotine replacement products, the dosage of some medicines you may have been taking may need adjustment. Talk to your healthcare provider if you are taking:     Adenosine (Adenocard)    Alpha blockers such as doxazosin (Cardura), prazosin (Minipress), and terazosin    Antianxiety medicines such as chlordiazepoxide, oxazepam, and diazepam (Valium)    Antiarrhythmic medicines (to treat irregular heartbeat) such as flecainide and mexiletine    Antidepressants such as amitriptyline, desipramine (Norpramin), fluvoxamine (Luvox), imipramine (Tofranil), and nortriptyline (Pamelor)    Antipsychotic medicines such as clozapine (Clozaril, FazaClo) and olanzapine (Zyprexa)    Beta blockers such as acebutolol (Sectral), atenolol (Tenormin), carvedilol (Coreg), labetalol (Trandate), metoprolol (Lopressor, Toprol), nadolol (Corgard), pindolol, and sotalol (Betapace, Sorine)    Bupropion (Aplenzin, Forfivo, Wellbutrin, Buproban, Zyban)    Caffeine in food, drinks, or medicines    Cancer medicines such as erlotinib (Tarceva) and irinotecan (Camptosar)    Cimetidine (Tagamet)    Decongestants such as oxymetazoline (Afrin, Dristan), phenylephrine (Ignacio-Synephrine, Sudafed PE), and pseudoephedrine (Sudafed)    Doxepin (Silenor)    Furosemide (Lasix)    Insulin    Medicines to treat breathing or lung problems such as aminophylline and  theophylline    Migraine medicines such as dihydroergotamine (D.H.[address]. [address], Migranal), ergotamine (Ergomar), and methylergonovine    Other medicines or products to help you stop smoking    Pentazocine (Talwin)    Propranolol (Hemangeol, Inderal, InnoPran)    Varenicline (Chantix)  If you are not sure if your medicines might interact, ask your pharmacist or healthcare provider. Keep a list of all your medicines with you. List all the prescription medicines, nonprescription medicines, supplements, natural remedies, and vitamins that you take. Be sure that you tell all healthcare providers who treat you about all the products you are taking.   How should I store this medicine?   Store this medicine at room temperature. Protect it from heat, high humidity, and bright light.    This advisory includes selected information only and may not include all side effects of this medicine or interactions with other medicines. Ask your healthcare provider or pharmacist for more information or if you have any questions.  Ask your pharmacist for the best way to dispose of outdated medicine or medicine you have not used. Do not throw medicine in the trash.  Keep all medicines out of the reach of children.   Do not share medicines with other people.   Developed by [employer].  Medication Advisor 2016.3 published by [employer].  Last modified: 2016-04-11  Last reviewed: 2015-03-19  This content is reviewed periodically and is subject to change as new health information becomes available. The information is intended to inform and educate and is not a replacement for medical evaluation, advice, diagnosis or treatment by a healthcare professional.  References   Medication Advisor 2016.3 Index    Copyright   2016 [employer], a division of [employer] All rights reserved.

## 2017-12-30 NOTE — NURSING NOTE
Patient Information     Patient Name MRN Tegan Larry [id number] Female 1990      Nursing Note by Suri Morel at 2017  3:00 PM     Author:  Suri Morel Service:  (none) Author Type:  (none)     Filed:  2017  3:31 PM Encounter Date:  2017 Status:  Signed     :  Suri Morel            Here today for a few issues.  She has pain in her knees daily.  She also thinks she may have a vaginal yeast infection.  She has itching, white cheesy discharge.  Also she would like to quit smoking.  Suri Morel LPN..........2017  3:28 PM'
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
## 2018-01-22 ENCOUNTER — OFFICE VISIT - GICH (OUTPATIENT)
Dept: FAMILY MEDICINE | Facility: OTHER | Age: 28
End: 2018-01-22

## 2018-01-22 ENCOUNTER — HISTORY (OUTPATIENT)
Dept: FAMILY MEDICINE | Facility: OTHER | Age: 28
End: 2018-01-22

## 2018-01-22 DIAGNOSIS — J45.20 MILD INTERMITTENT ASTHMA, UNCOMPLICATED: ICD-10-CM

## 2018-01-22 DIAGNOSIS — Z71.89 OTHER SPECIFIED COUNSELING: Chronic | ICD-10-CM

## 2018-01-22 ASSESSMENT — PATIENT HEALTH QUESTIONNAIRE - PHQ9: SUM OF ALL RESPONSES TO PHQ QUESTIONS 1-9: 1

## 2018-01-26 VITALS
DIASTOLIC BLOOD PRESSURE: 64 MMHG | BODY MASS INDEX: 33.11 KG/M2 | SYSTOLIC BLOOD PRESSURE: 104 MMHG | TEMPERATURE: 97.8 F | WEIGHT: 175.25 LBS

## 2018-01-31 ASSESSMENT — PATIENT HEALTH QUESTIONNAIRE - PHQ9: SUM OF ALL RESPONSES TO PHQ QUESTIONS 1-9: 10

## 2018-02-09 VITALS
WEIGHT: 186 LBS | HEIGHT: 62 IN | SYSTOLIC BLOOD PRESSURE: 106 MMHG | HEART RATE: 72 BPM | DIASTOLIC BLOOD PRESSURE: 58 MMHG | BODY MASS INDEX: 34.23 KG/M2

## 2018-02-10 ASSESSMENT — PATIENT HEALTH QUESTIONNAIRE - PHQ9: SUM OF ALL RESPONSES TO PHQ QUESTIONS 1-9: 1
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
## 2018-02-13 NOTE — NURSING NOTE
Patient Information     Patient Name MRTegan Zapata [id number] Female 1990      Nursing Note by Maci Kan at 2018 11:15 AM     Author:  Maci Kan Service:  (none) Author Type:  (none)     Filed:  2018 11:37 AM Encounter Date:  2018 Status:  Signed     :  Maci Kan            Patient presents to the clinic today to discuss getting medications for traveling.    Maci Kan LPN.................. 2018 11:28 AM

## 2018-02-13 NOTE — DISCHARGE SUMMARY
Patient Information     Patient Name MRN Sex Tegan Mayorga [id number] Female 1990      Discharge Summaries by Yusra Carvalho PT at 2018 12:55 PM     Author:  Yusra Carvalho PT Service:  (none) Author Type:  PT- Physical Therapist     Filed:  2018 12:57 PM Date of Service:  2018 12:55 PM Status:  Signed     :  Yusra Carvalho PT (PT- Physical Therapist)            [facility]  Outpatient PT - Discharge Summary    Date of discharge: 2018     Patient Name: Tegan Antoine   YOB: 1990   Referring MD/Provider: Courtney Ascencio PA-C  Medical and Treatment Diagnosis: Bilateral knee pain  PT Treatment Diagnosis: bilateral patellofemoral pain, hip weakness  Insurance: Other:  Care  Start of Service: 17         Subjective  from last visit on 10/23/17:      Patient states knee pain is a 1/10 today. Feels stiff today. Hasn't been very good about home exercises lately.     Dates of Service: 17    Thru:  10/23/17  Number of visits: 5           Reason for Discharge:  Goals partially met  Patient discontinued Therapy for unknown reasons  Patient failed to schedule further appointments    Progress from Initial to Discharge (if applicable):  Decreased pain   Increased strength   Improved stair ambulation with decreased deviation    Comments: Please see last visit note from 10/23 for details of patient progress. This is an unplanned discharge.    Further Recommendations:    Patient will continue with established home exercise program    Patient is discharged from Physical Therapy    Thank you for your referral to [facility].  Please call with any questions, concerns or comments.  [phone]            Yusra Carvalho, PT, DPT

## 2018-02-13 NOTE — PATIENT INSTRUCTIONS
Patient Information     Patient Name MRN Sex Tegan Mayorga [id number] Female 1990      Patient Instructions by Kellie Lemons MD at 2018 11:15 AM     Author:  Kellie Lemons MD Service:  (none) Author Type:  Physician     Filed:  2018 11:43 AM Encounter Date:  2018 Status:  Signed     :  Kellie Lemons MD (Physician)            pepto bismol tablets - start right away if any diarrhea

## 2018-02-15 ENCOUNTER — DOCUMENTATION ONLY (OUTPATIENT)
Dept: FAMILY MEDICINE | Facility: OTHER | Age: 28
End: 2018-02-15

## 2018-02-15 PROBLEM — E66.9 OBESITY: Status: ACTIVE | Noted: 2018-02-15

## 2018-02-15 PROBLEM — F17.200 TOBACCO DEPENDENCE: Status: ACTIVE | Noted: 2017-09-21

## 2018-05-22 ENCOUNTER — OFFICE VISIT (OUTPATIENT)
Dept: FAMILY MEDICINE | Facility: OTHER | Age: 28
End: 2018-05-22
Attending: FAMILY MEDICINE
Payer: COMMERCIAL

## 2018-05-22 VITALS
BODY MASS INDEX: 35.67 KG/M2 | WEIGHT: 195 LBS | SYSTOLIC BLOOD PRESSURE: 90 MMHG | DIASTOLIC BLOOD PRESSURE: 64 MMHG | HEART RATE: 62 BPM

## 2018-05-22 DIAGNOSIS — M22.2X2 PATELLOFEMORAL PAIN SYNDROME OF BOTH KNEES: Primary | ICD-10-CM

## 2018-05-22 DIAGNOSIS — M22.2X1 PATELLOFEMORAL PAIN SYNDROME OF BOTH KNEES: Primary | ICD-10-CM

## 2018-05-22 PROCEDURE — G0463 HOSPITAL OUTPT CLINIC VISIT: HCPCS

## 2018-05-22 PROCEDURE — 99213 OFFICE O/P EST LOW 20 MIN: CPT | Performed by: FAMILY MEDICINE

## 2018-05-22 ASSESSMENT — PAIN SCALES - GENERAL: PAINLEVEL: MILD PAIN (2)

## 2018-05-22 NOTE — PROGRESS NOTES
Nursing Notes:   Aparna Castillo LPN  2018  2:49 PM  Signed  Patient presents to clinic with c/o bilateral knee pain.  Oralia Jonathan SALDIVAR ...... 2018 2:19 PM          SUBJECTIVE:   CC:  Tegan Antoine is a 27 year old female who presents to clinic today for the following health issues:  Bilateral knee pain    HPI  Tegan Antoine is a 27 year old female in for follow up of knee pain.  Was in last fall for evaluation of symptoms.   Ongoing symptoms - worsening now.  Right knee with more symptoms than left.  One episode of right knee locking - also felt like it might give our then.  This was with getting up from the floor.  At time last seen - went to physical therapy but was doing [employer] at the same time.  Activity such as stairs make it worse.  First in the morning is not as bad.  No swelling.  Gained wt after stopping [employer].  Having her boyfriend massage them doesn't help - continues to be tender.  She has used creitam tea - drinks this daily - for her knee pain.  No other over the counter products.  Last weekend she went for a bike ride and her knees felt it then and into the next day.    SH:  Works [employer] - on her feet for work 6-8 hours.         No Known Allergies  No current outpatient prescriptions on file.     No current facility-administered medications for this visit.       Patient Active Problem List    Diagnosis Date Noted     Obesity 02/15/2018     Priority: Medium     Overview:   BMI - 31       Tobacco dependence 2017     Priority: Medium     Vitamin D deficiency 2013     Priority: Medium     Asthma 09/15/2010     Priority: Medium     Overview:   intermittent/allergen       Past Surgical History:   Procedure Laterality Date      SECTION      12/13/10     COLPOSCOPY, BIOPSY, COMBINED           Family History   Problem Relation Age of Onset     Other - See Comments Daughter      No Known Problems     HEART DISEASE Father      Heart  Disease,Arrhythmia     Other - See Comments Sister      Psychiatric illness,Depression-currently being treated, resolved     DIABETES Mother      Diabetes     Other - See Comments Maternal Grandmother      Alzheimer's     CANCER Paternal Grandmother      Cancer     Prostate Cancer Maternal Uncle      Cancer-prostate       Review of Systems     PHQ-2 Score:     PHQ-2 ( 1999 [employer]) 5/22/2018   Q1: Little interest or pleasure in doing things 0   Q2: Feeling down, depressed or hopeless 0   PHQ-2 Score 0         PHQ-9 SCORE 7/21/2016 9/21/2017 1/22/2018   Total Score 8 10 1         OBJECTIVE:     BP 90/64 (BP Location: Right arm, Patient Position: Sitting, Cuff Size: Adult Large)  Pulse 62  Wt 195 lb (88.5 kg)  LMP 04/30/2018 (Approximate)  BMI 35.67 kg/m2  Body mass index is 35.67 kg/(m^2).   Wt Readings from Last 3 Encounters:   05/22/18 195 lb (88.5 kg)   01/22/18 186 lb (84.4 kg)   09/21/17 175 lb 4 oz (79.5 kg)       Physical Exam   Constitutional: She appears well-developed and well-nourished.   Musculoskeletal:   1+ crepitus of right knee, negative on the left.  Full flexion extension present.  Right knee with tenderness along the inferior pole of the patella, along the patellar tibial ligament.  Mild medial joint line tenderness.  The left knee also has medial joint line tenderness, but less tenderness of the inferior patella.   Nursing note and vitals reviewed.       Results for orders placed or performed during the hospital encounter of 09/21/17   XR Knee Standing 2v Bilateral & 2v Bilateral    Narrative    PROCEDURE:  XR KNEE WB 2 VIEWS BILATERAL AND 2 VIEWS BILATERAL    HISTORY: Acute pain of both knees.    COMPARISON:  04/28/2016    TECHNIQUE:  6 views of the knees.    FINDINGS:  No fracture or dislocation is identified. The joint spaces are preserved. No foreign body is seen.      Impression    No acute fracture.    Electronically Signed By: Rico Qureshi on 9/21/2017 5:14 PM         ASSESSMENT/PLAN:        ICD-10-CM    1. Patellofemoral pain syndrome of both knees M22.2X1 PHYSICAL THERAPY REFERRAL    M22.2X2 ORTHOPEDICS ADULT REFERRAL            PLAN:  1.  Patient is very agreeable to returning to physical therapy at this time.  She feels with the degree of an type of exercising that she was doing before hand, that that may have exacerbated her symptoms.  Now with having gained weight back, 20 pounds since she was seen for the symptoms last fall, that that may be contributing to her symptoms as well.  2.  She is also agreeable to seeing orthopedics.  She would likely decline an injection.  If bracing or other imaging modalities were offered, she would be agreeable to this.    Follow-up as needed      YESSY SANCHEZ MD  [facility]

## 2018-05-22 NOTE — NURSING NOTE
Patient presents to clinic with c/o bilateral knee pain.  Oralia Castillo LPN ...... 5/22/2018 2:19 PM

## 2018-06-01 ENCOUNTER — TRANSFERRED RECORDS (OUTPATIENT)
Dept: HEALTH INFORMATION MANAGEMENT | Facility: OTHER | Age: 28
End: 2018-06-01

## 2018-06-04 ENCOUNTER — HOSPITAL ENCOUNTER (OUTPATIENT)
Dept: PHYSICAL THERAPY | Facility: OTHER | Age: 28
Setting detail: THERAPIES SERIES
End: 2018-06-04
Attending: FAMILY MEDICINE
Payer: COMMERCIAL

## 2018-06-04 PROCEDURE — 97161 PT EVAL LOW COMPLEX 20 MIN: CPT | Mod: GP

## 2018-06-04 PROCEDURE — 40000185 ZZHC STATISTIC PT OUTPT VISIT

## 2018-06-04 PROCEDURE — 97110 THERAPEUTIC EXERCISES: CPT | Mod: GP

## 2018-06-05 NOTE — PROGRESS NOTES
06/04/18 1400   General Information   Type of Visit Initial OP Ortho PT Evaluation   Start of Care Date 06/04/18   Referring Physician Dr. Andres   Patient/Family Goals Statement decrease pain    Orders Evaluate and Treat   Date of Order 05/22/18   Insurance Type Other  ( Care )   Medical Diagnosis Patellofemoral pain syndrome of both knees   Surgical/Medical history reviewed Yes   Precautions/Limitations no known precautions/limitations   Body Part(s)   Body Part(s) Knee   Presentation and Etiology   Pertinent history of current problem (include personal factors and/or comorbidities that impact the POC) Patient states both knees have been painful for the past 2 years. Currently, pain is constant, but varies in intensity.  States symptoms have been getting worse   Impairments A. Pain;B. Decreased WB tolerance;F. Decreased strength and endurance;H. Impaired gait   Functional Limitations perform activities of daily living;perform required work activities;perform desired leisure / sports activities   Symptom Location Anterior knees bilaterally    How/Where did it occur From insidious onset   Chronicity Chronic   Pain rating (0-10 point scale) Best (/10);Worst (/10)   Best (/10) 2-3/10   Worst (/10) 7/10   Pain quality A. Sharp;B. Dull;C. Aching;E. Shooting   Frequency of pain/symptoms A. Constant   Pain/symptoms are: (worse with activity )   Pain/symptoms exacerbated by B. Walking;L. Work tasks;M. Other  (Standing, and climbing )   Pain/symptoms eased by A. Sitting;C. Rest;E. Changing positions;H. Cold   Current Level of Function   Patient role/employment history A. Employed  (Delivers pizza)   Fall Risk Screen   Fall screen completed by PT   Have you fallen 2 or more times in the past year? No   Have you fallen and had an injury in the past year? No   Is patient a fall risk? No   Functional Scales   Functional Scales (PSFS)   Knee Objective Findings   Observation Valgus knees bilaterally    Gait/Locomotion  "Normal gait pattern    Knee/Hip Strength Comments Strength 5/5 knee flexion and extension B.  Hip abduction and extension strength 3+/5 B   Lachmans Test negative B   Anterior Drawer Test negative B   Varus Stress Test negative B   Valgus Stress Test negative B   Knee Special Test Comments Functional knee valgus noted on quat and step down from 6\" step.    Palpation pain along the peripatellar region bilaterally    Right Knee Extension AROM 0   Right Knee Flexion AROM 130   Left Knee Extension AROM 0   Left Knee Flexion AROM 130   Planned Therapy Interventions   Planned Therapy Interventions balance training;gait training;joint mobilization;manual therapy;neuromuscular re-education;ROM;strengthening;stretching   Planned Modality Interventions   Planned Modality Interventions Cryotherapy;Iontophoresis;Ultrasound   Clinical Impression   Criteria for Skilled Therapeutic Interventions Met yes, treatment indicated   PT Diagnosis Patellofemoral pain bilaterally    Influenced by the following impairments pain, weakness,    Functional limitations due to impairments difficulty on stairs, getting out of a car, prolonged standing, squatting    Clinical Presentation Stable/Uncomplicated   Clinical Presentation Rationale See PSFS    Clinical Decision Making (Complexity) Low complexity   Therapy Frequency 2 times/Week   Predicted Duration of Therapy Intervention (days/wks) 8 weeks   Risk & Benefits of therapy have been explained Yes   Patient, Family & other staff in agreement with plan of care Yes   Education Assessment   Preferred Learning Style Listening;Demonstration;Pictures/video   Barriers to Learning No barriers   ORTHO GOALS   PT Ortho Eval Goals 1;2;3   Ortho Goal 1   Goal Identifier pain    Goal Description Report 50% decrease in pain on stairs    Target Date 07/31/18   Ortho Goal 2   Goal Identifier weakness    Goal Description Demonstrate improved knee control on stairs    Target Date 07/31/18   Ortho Goal 3   Goal " Identifier work    Goal Description Report 50% decrease in pain when standing at work    Target Date 07/31/18   Total Evaluation Time   Total Evaluation Time 20

## 2018-06-11 ENCOUNTER — HOSPITAL ENCOUNTER (OUTPATIENT)
Dept: PHYSICAL THERAPY | Facility: OTHER | Age: 28
Setting detail: THERAPIES SERIES
End: 2018-06-11
Attending: FAMILY MEDICINE
Payer: COMMERCIAL

## 2018-06-11 PROCEDURE — 40000185 ZZHC STATISTIC PT OUTPT VISIT

## 2018-06-11 PROCEDURE — 97110 THERAPEUTIC EXERCISES: CPT | Mod: GP

## 2018-06-13 ENCOUNTER — HOSPITAL ENCOUNTER (OUTPATIENT)
Dept: PHYSICAL THERAPY | Facility: OTHER | Age: 28
Setting detail: THERAPIES SERIES
End: 2018-06-13
Attending: FAMILY MEDICINE
Payer: COMMERCIAL

## 2018-06-13 PROCEDURE — 97110 THERAPEUTIC EXERCISES: CPT | Mod: GP

## 2018-06-13 PROCEDURE — 40000185 ZZHC STATISTIC PT OUTPT VISIT

## 2018-06-18 ENCOUNTER — HOSPITAL ENCOUNTER (OUTPATIENT)
Dept: PHYSICAL THERAPY | Facility: OTHER | Age: 28
Setting detail: THERAPIES SERIES
End: 2018-06-18
Attending: FAMILY MEDICINE
Payer: COMMERCIAL

## 2018-06-18 PROCEDURE — 40000185 ZZHC STATISTIC PT OUTPT VISIT

## 2018-06-18 PROCEDURE — 97110 THERAPEUTIC EXERCISES: CPT | Mod: GP

## 2018-06-19 ENCOUNTER — OFFICE VISIT (OUTPATIENT)
Dept: FAMILY MEDICINE | Facility: OTHER | Age: 28
End: 2018-06-19
Attending: NURSE PRACTITIONER
Payer: COMMERCIAL

## 2018-06-19 VITALS
HEIGHT: 61 IN | SYSTOLIC BLOOD PRESSURE: 102 MMHG | DIASTOLIC BLOOD PRESSURE: 70 MMHG | HEART RATE: 80 BPM | WEIGHT: 193 LBS | BODY MASS INDEX: 36.44 KG/M2

## 2018-06-19 DIAGNOSIS — E66.812 CLASS 2 OBESITY WITHOUT SERIOUS COMORBIDITY WITH BODY MASS INDEX (BMI) OF 36.0 TO 36.9 IN ADULT, UNSPECIFIED OBESITY TYPE: ICD-10-CM

## 2018-06-19 DIAGNOSIS — E78.5 HYPERLIPIDEMIA, UNSPECIFIED HYPERLIPIDEMIA TYPE: ICD-10-CM

## 2018-06-19 DIAGNOSIS — R63.5 WEIGHT GAIN: Primary | ICD-10-CM

## 2018-06-19 LAB
ANION GAP SERPL CALCULATED.3IONS-SCNC: 10 MMOL/L (ref 3–14)
BUN SERPL-MCNC: 14 MG/DL (ref 7–25)
CALCIUM SERPL-MCNC: 9.2 MG/DL (ref 8.6–10.3)
CHLORIDE SERPL-SCNC: 103 MMOL/L (ref 98–107)
CHOLEST SERPL-MCNC: 224 MG/DL
CO2 SERPL-SCNC: 24 MMOL/L (ref 21–31)
CREAT SERPL-MCNC: 0.64 MG/DL (ref 0.6–1.2)
GFR SERPL CREATININE-BSD FRML MDRD: >90 ML/MIN/1.7M2
GLUCOSE SERPL-MCNC: 97 MG/DL (ref 70–105)
HDLC SERPL-MCNC: 57 MG/DL (ref 23–92)
LDLC SERPL CALC-MCNC: 140 MG/DL
NONHDLC SERPL-MCNC: 167 MG/DL
POTASSIUM SERPL-SCNC: 4.1 MMOL/L (ref 3.5–5.1)
SODIUM SERPL-SCNC: 137 MMOL/L (ref 134–144)
TRIGL SERPL-MCNC: 137 MG/DL
TSH SERPL DL<=0.05 MIU/L-ACNC: 1.89 IU/ML (ref 0.34–5.6)

## 2018-06-19 PROCEDURE — 84443 ASSAY THYROID STIM HORMONE: CPT | Performed by: NURSE PRACTITIONER

## 2018-06-19 PROCEDURE — 36415 COLL VENOUS BLD VENIPUNCTURE: CPT | Performed by: NURSE PRACTITIONER

## 2018-06-19 PROCEDURE — 99214 OFFICE O/P EST MOD 30 MIN: CPT | Performed by: NURSE PRACTITIONER

## 2018-06-19 PROCEDURE — G0463 HOSPITAL OUTPT CLINIC VISIT: HCPCS

## 2018-06-19 PROCEDURE — 80061 LIPID PANEL: CPT | Performed by: NURSE PRACTITIONER

## 2018-06-19 PROCEDURE — 80048 BASIC METABOLIC PNL TOTAL CA: CPT | Performed by: NURSE PRACTITIONER

## 2018-06-19 RX ORDER — GABAPENTIN 300 MG/1
300 CAPSULE ORAL 3 TIMES DAILY
Refills: 6 | COMMUNITY
Start: 2018-06-01

## 2018-06-19 RX ORDER — ALBUTEROL SULFATE 90 UG/1
AEROSOL, METERED RESPIRATORY (INHALATION) PRN
Refills: 3 | COMMUNITY
Start: 2018-06-01 | End: 2019-02-18

## 2018-06-19 RX ORDER — DILTIAZEM HYDROCHLORIDE 60 MG/1
TABLET, FILM COATED ORAL
Refills: 0 | COMMUNITY
Start: 2018-01-22

## 2018-06-19 ASSESSMENT — ANXIETY QUESTIONNAIRES
2. NOT BEING ABLE TO STOP OR CONTROL WORRYING: NOT AT ALL
1. FEELING NERVOUS, ANXIOUS, OR ON EDGE: NOT AT ALL

## 2018-06-19 ASSESSMENT — PAIN SCALES - GENERAL: PAINLEVEL: NO PAIN (0)

## 2018-06-19 NOTE — PROGRESS NOTES
SUBJECTIVE:   Tegan Antoine is a 27 year old female who presents to clinic today for the following health issues:    Obesity  Had lost 40 pounds last year (down to 140-something) and now up to 190-something.  Increased knee pain which she feels has limited her ability to exercise. She is doing physical therapy for her knee pain which she feels is helping but she heard on the radio that weight loss can reduce knee pain by 25% so she is hoping that the weight loss plus physical therapy will provide her with even better relief.   [place]- [place] a lot of work outside.  Does not eat a lot of sugar- tries to use Stevia, was vegetarian for about 7 months which she thinks might have contributed to weight gain as she was eating more carbs. Now eating meat again. Trying to eat high protein and avoiding wheat, corn.  Denies abdominal pain, nausea, vomiting.  Does sometimes have issues with constipation- last BM has been awhile. Denies issues with diarrhea.  Denies urinary concerns but does feel like she has not been drinking enough for adequate hydration as urine has been darker.  She does not track her intake. Discussed apps that can be used such as [employer]. She does know how to use this nichole.  Denies changes to hair, skin, nails, cold/heat intolerances, polyphagia, polydipsia, polyuria.    Abdominal surgeries:  section in     Family History: Maternal and Paternal families have issues with being overweight.        Problem list and histories reviewed & adjusted, as indicated.  Additional history: as documented    Patient Active Problem List   Diagnosis     Asthma     Obesity     Tobacco dependence     Vitamin D deficiency     Past Surgical History:   Procedure Laterality Date      SECTION      12/13/10     COLPOSCOPY, BIOPSY, COMBINED             Social History   Substance Use Topics     Smoking status: Former Smoker     Packs/day: 0.75     Years: 8.00     Types: Cigarettes     Quit  "date: 1/15/2016     Smokeless tobacco: Never Used     Alcohol use No      Comment: Alcoholic Drinks/day: rare     Family History   Problem Relation Age of Onset     Other - See Comments Daughter      No Known Problems     HEART DISEASE Father      Heart Disease,Arrhythmia     Other - See Comments Sister      Psychiatric illness,Depression-currently being treated, resolved     Diabetes Mother      Diabetes     Other - See Comments Maternal Grandmother      Alzheimer's     Cancer Paternal Grandmother      Cancer     Prostate Cancer Maternal Uncle      Cancer-prostate         Current Outpatient Prescriptions   Medication Sig Dispense Refill     gabapentin (NEURONTIN) 300 MG capsule   6     SYMBICORT 80-4.5 MCG/ACT Inhaler INHALE 2 PUFFS PO BID. WHILE IN PERU.  0     VENTOLIN  (90 Base) MCG/ACT Inhaler   3     No Known Allergies  Recent Labs   Lab Test  04/01/17   0830  12/23/16   1430   CR  0.60*  0.53*   GFRESTBLACK  >60  >60   POTASSIUM  4.3  3.8      BP Readings from Last 3 Encounters:   06/19/18 102/70   05/22/18 90/64   01/22/18 106/58    Wt Readings from Last 3 Encounters:   06/19/18 193 lb (87.5 kg)   05/22/18 195 lb (88.5 kg)   01/22/18 186 lb (84.4 kg)           Reviewed and updated as needed this visit by clinical staff  Tobacco  Meds  Med Hx  Surg Hx  Fam Hx  Soc Hx      Reviewed and updated as needed this visit by Provider         ROS:  Constitutional, HEENT, cardiovascular, pulmonary, gi and gu systems are negative, except as otherwise noted.    OBJECTIVE:     /70 (BP Location: Right arm, Patient Position: Sitting, Cuff Size: Adult Large)  Pulse 80  Ht 5' 1\" (1.549 m)  Wt 193 lb (87.5 kg)  BMI 36.47 kg/m2  Body mass index is 36.47 kg/(m^2).     GENERAL: healthy, alert and no distress  RESP: lungs clear to auscultation - no rales, rhonchi or wheezes  CV: regular rate and rhythm, normal S1 S2, no S3 or S4, no murmur, click or rub  ABDOMEN: overweight, soft, nontender, no " hepatosplenomegaly, no masses and bowel sounds normal  NEURO: Normal strength and tone, mentation intact and speech normal  PSYCH: mentation appears normal, affect normal/bright    Diagnostic Test Results:  Results for orders placed or performed in visit on 06/19/18 (from the past 24 hour(s))   TSH   Result Value Ref Range    Thyrotropin 1.89 0.34 - 5.60 IU/mL   Basic metabolic panel   Result Value Ref Range    Sodium 137 134 - 144 mmol/L    Potassium 4.1 3.5 - 5.1 mmol/L    Chloride 103 98 - 107 mmol/L    Carbon Dioxide 24 21 - 31 mmol/L    Anion Gap 10 3 - 14 mmol/L    Glucose 97 70 - 105 mg/dL    Urea Nitrogen 14 7 - 25 mg/dL    Creatinine 0.64 0.60 - 1.20 mg/dL    GFR Estimate >90 >60 mL/min/1.7m2    GFR Estimate If Black >90 >60 mL/min/1.7m2    Calcium 9.2 8.6 - 10.3 mg/dL   Lipid panel   Result Value Ref Range    Cholesterol 224 (H) <200 mg/dL    Triglycerides 137 <150 mg/dL    HDL Cholesterol 57 23 - 92 mg/dL    LDL Cholesterol Calculated 140 (H) <100 mg/dL    Non HDL Cholesterol 167 (H) <130 mg/dL       ASSESSMENT/PLAN:     1. Class 2 obesity without serious comorbidity with body mass index (BMI) of 36.0 to 36.9 in adult, unspecified obesity type  - TSH  - Basic metabolic panel  - Lipid panel  - NUTRITION REFERRAL    2. Weight gain  Labs as above  Referral to nutritionist    3. Hyperlipidemia, unspecified hyperlipidemia type  Encouraged heart healthy diet and weight loss  Referral to nutritionist who should be able to help with this as well.        Tali Casillas, [facility]

## 2018-06-19 NOTE — NURSING NOTE
Patient presents to the clinic for a referral regarding weight loss.  Madan Hummel ..............6/19/2018 9:00 AM

## 2018-06-19 NOTE — LETTER
June 19, 2018      Tegan Antoine  [address]  [place] [address]        Dear ,    We are writing to inform you of your test results.    Your test results fall within the expected range(s) or remain unchanged from previous results.  Please continue with current treatment plan.    Resulted Orders   TSH   Result Value Ref Range    Thyrotropin 1.89 0.34 - 5.60 IU/mL   Basic metabolic panel   Result Value Ref Range    Sodium 137 134 - 144 mmol/L    Potassium 4.1 3.5 - 5.1 mmol/L    Chloride 103 98 - 107 mmol/L    Carbon Dioxide 24 21 - 31 mmol/L    Anion Gap 10 3 - 14 mmol/L    Glucose 97 70 - 105 mg/dL    Urea Nitrogen 14 7 - 25 mg/dL    Creatinine 0.64 0.60 - 1.20 mg/dL    GFR Estimate >90 >60 mL/min/1.7m2    GFR Estimate If Black >90 >60 mL/min/1.7m2    Calcium 9.2 8.6 - 10.3 mg/dL   Lipid panel   Result Value Ref Range    Cholesterol 224 (H) <200 mg/dL    Triglycerides 137 <150 mg/dL    HDL Cholesterol 57 23 - 92 mg/dL    LDL Cholesterol Calculated 140 (H) <100 mg/dL      Comment:      Above desirable:  100-129 mg/dl  Borderline High:  130-159 mg/dL  High:             160-189 mg/dL  Very high:       >189 mg/dl      Non HDL Cholesterol 167 (H) <130 mg/dL      Comment:      Above Desirable:  130-159 mg/dl  Borderline high:  160-189 mg/dl  High:             190-219 mg/dl  Very high:       >219 mg/dl         If you have any questions or concerns, please call the clinic at the number listed above.       Sincerely,        Tali Casillas, CNP

## 2018-06-19 NOTE — MR AVS SNAPSHOT
After Visit Summary   6/19/2018    Tegan Antoine    MRN: [id number]           Patient Information     Date Of Birth          1990        Visit Information        Provider Department      6/19/2018 9:00 AM Tali Casillas CNP [facility]        Today's Diagnoses     Weight gain    -  1    Class 2 obesity without serious comorbidity with body mass index (BMI) of 36.0 to 36.9 in adult, unspecified obesity type        Hyperlipidemia, unspecified hyperlipidemia type           Follow-ups after your visit        Additional Services     NUTRITION REFERRAL       GICH                  Your next 10 appointments already scheduled     Jun 20, 2018 10:30 AM CDT   Treatment with Pavan A Orstad, PT   [facility]    [address] [place] [id number] [phone]            Jun 25, 2018 10:30 AM CDT   Treatment with Pavan A Orstad, PT   [facility])    [address] [facility] [id number] [phone]              Who to contact     If you have questions or need follow up information about today's clinic visit or your schedule please contact [facility] directly at [phone].  Normal or non-critical lab and imaging results will be communicated to you by MyChart, letter or phone within 4 business days after the clinic has received the results. If you do not hear from us within 7 days, please contact the clinic through MyChart or phone. If you have a critical or abnormal lab result, we will notify you by phone as soon as possible.  Submit refill requests through [employer] or call your pharmacy and they will forward the refill request to us. Please allow 3 business days for your refill to be completed.          Additional Information About Your Visit        FuelFilm[employer] Information     [employer] lets you send messages to your doctor, view your  "test results, renew your prescriptions, schedule appointments and more. To sign up, go to [email or web address] . Click on \"Log in\" on the left side of the screen, which will take you to the Welcome page. Then click on \"Sign up Now\" on the right side of the page.     You will be asked to enter the access code listed below, as well as some personal information. Please follow the directions to create your username and password.     Your access code is: [email or web address]  Expires: 2018  5:08 PM     Your access code will  in 90 days. If you need help or a new code, please call your [place] clinic or [phone].        Care EveryWhere ID     This is your Care EveryWhere ID. This could be used by other organizations to access your [place] medical records  [id number]        Your Vitals Were     Pulse Height BMI (Body Mass Index)             80 5' 1\" (1.549 m) 36.47 kg/m2          Blood Pressure from Last 3 Encounters:   18 102/70   18 90/64   18 106/58    Weight from Last 3 Encounters:   18 193 lb (87.5 kg)   18 195 lb (88.5 kg)   18 186 lb (84.4 kg)              We Performed the Following     Basic metabolic panel     Lipid panel     NUTRITION REFERRAL     TSH        Primary Care Provider Office Phone # Fax #    Kellie JAMIN Short [phone] [address] [facility] [address]        Equal Access to Services     [facility] AH: Hadii brittani duckworth Sonatalia, waaxda luqadaha, qaybta kaalmada rico, sinai wise. So [facility] [phone].    ATENCIÓN: Si habla español, tiene a fuentes disposición servicios gratuitos de asistencia lingüística. Llconnor al [phone].    We comply with applicable federal civil rights laws and [place] laws. We do not discriminate on the basis of race, color, national origin, age, disability, sex, sexual orientation, or gender identity.            Thank you!     Thank you for " choosing [facility]  for your care. Our goal is always to provide you with excellent care. Hearing back from our patients is one way we can continue to improve our services. Please take a few minutes to complete the written survey that you may receive in the mail after your visit with us. Thank you!             Your Updated Medication List - Protect others around you: Learn how to safely use, store and throw away your medicines at www.disposemymeds.org.          This list is accurate as of 6/19/18 12:39 PM.  Always use your most recent med list.                   Brand Name Dispense Instructions for use Diagnosis    gabapentin 300 MG capsule    NEURONTIN          SYMBICORT 80-4.5 MCG/ACT Inhaler   Generic drug:  budesonide-formoterol      INHALE 2 PUFFS PO BID. WHILE IN PERU.        VENTOLIN  (90 Base) MCG/ACT Inhaler   Generic drug:  albuterol

## 2018-06-25 ENCOUNTER — HOSPITAL ENCOUNTER (OUTPATIENT)
Dept: PHYSICAL THERAPY | Facility: OTHER | Age: 28
Setting detail: THERAPIES SERIES
End: 2018-06-25
Attending: FAMILY MEDICINE
Payer: COMMERCIAL

## 2018-06-25 PROCEDURE — 40000185 ZZHC STATISTIC PT OUTPT VISIT

## 2018-06-25 PROCEDURE — 97110 THERAPEUTIC EXERCISES: CPT | Mod: GP

## 2018-07-03 ENCOUNTER — HOSPITAL ENCOUNTER (OUTPATIENT)
Dept: PHYSICAL THERAPY | Facility: OTHER | Age: 28
Setting detail: THERAPIES SERIES
End: 2018-07-03
Attending: FAMILY MEDICINE
Payer: COMMERCIAL

## 2018-07-03 PROCEDURE — 97110 THERAPEUTIC EXERCISES: CPT | Mod: GP

## 2018-07-03 PROCEDURE — 40000185 ZZHC STATISTIC PT OUTPT VISIT

## 2018-07-18 ENCOUNTER — HOSPITAL ENCOUNTER (OUTPATIENT)
Dept: PHYSICAL THERAPY | Facility: OTHER | Age: 28
Setting detail: THERAPIES SERIES
End: 2018-07-18
Attending: FAMILY MEDICINE
Payer: COMMERCIAL

## 2018-07-18 PROCEDURE — 97110 THERAPEUTIC EXERCISES: CPT | Mod: GP

## 2018-07-18 PROCEDURE — 40000185 ZZHC STATISTIC PT OUTPT VISIT

## 2018-07-23 NOTE — PROGRESS NOTES
Patient Information     Patient Name  Tegan Antoine MRN  [id number] Sex  Female   1990      Letter by Courtney Ascencio PA-C at      Author:  Courtney Ascencio PA-C Service:  (none) Author Type:  (none)    Filed:   Date of Service:   Status:  (Other)         Tegan Antoine  [address]  [address]  [place] [address]    2017      Dear Ms. Antoine,      We've received the results back from the imaging.  Your results are normal. Please contact us at [phone] with any questions or concerns that you have.    I attached your results for your records.        Take Care,         Courtney Ascencio PA-C      Resulted Orders    XR KNEE WB 2 VIEWS BILATERAL AND 2 VIEWS BILATERAL (Exam End: 2017  4:07 PM)     Narrative    PROCEDURE:  XR KNEE WB 2 VIEWS BILATERAL AND 2 VIEWS BILATERAL    HISTORY: Acute pain of both knees.    COMPARISON:  2016    TECHNIQUE:  6 views of the knees.    FINDINGS:  No fracture or dislocation is identified. The joint spaces are preserved. No foreign body is seen.     IMPRESSION: No acute fracture.      Electronically Signed By: Rico Qureshi on 2017 5:14 PM

## 2018-08-07 ENCOUNTER — HOSPITAL ENCOUNTER (OUTPATIENT)
Dept: PHYSICAL THERAPY | Facility: OTHER | Age: 28
Setting detail: THERAPIES SERIES
End: 2018-08-07
Attending: FAMILY MEDICINE
Payer: COMMERCIAL

## 2018-08-07 PROCEDURE — 40000185 ZZHC STATISTIC PT OUTPT VISIT

## 2018-08-07 PROCEDURE — 97110 THERAPEUTIC EXERCISES: CPT | Mod: GP

## 2018-08-07 NOTE — PROGRESS NOTES
Outpatient Physical Therapy Discharge Note     Patient: Tegan Antoine  : 1990    Beginning/End Dates of Reporting Period:  18 to 2018    Referring Provider: Dr. Andres    Therapy Diagnosis: Patellofemoral syndrome both knees      Client Self Report: Patient states she is ready to proceed with independent HEP.  She plans to join the [facility]     Objective Measurements:  Objective Measure: Pain rating   Details: 4/10 both knees,  Pain will increase during the day to 5-610      Outcome Measures (most recent score):  PSFS score 1430    Goals:  Goal Identifier pain    Goal Description Report 50% decrease in pain on stairs    Target Date 18   Date Met      Progress: Not met due to continued knee pain with this activity     Goal Identifier weakness    Goal Description Demonstrate improved knee control on stairs    Target Date 18   Date Met      Progress:  Progressing, Patient demonstrates improved knee control      Goal Identifier work    Goal Description Report 50% decrease in pain when standing at work    Target Date 18   Date Met      Progress: Not met due to continued knee pain with this activity       Progress Toward Goals:   Progress this reporting period: patient reports independent HEP. She feels ready to proceed with independent management.        Plan:  Discharge from therapy.    Discharge:    Reason for Discharge: Patient will proceed with independent HEP        Discharge Plan: Patient to continue home program.

## 2018-08-13 ENCOUNTER — OFFICE VISIT (OUTPATIENT)
Dept: FAMILY MEDICINE | Facility: OTHER | Age: 28
End: 2018-08-13
Attending: FAMILY MEDICINE
Payer: COMMERCIAL

## 2018-08-13 DIAGNOSIS — E66.9 OBESITY: ICD-10-CM

## 2018-08-13 DIAGNOSIS — E55.9 VITAMIN D DEFICIENCY: ICD-10-CM

## 2018-08-13 PROCEDURE — 97802 MEDICAL NUTRITION INDIV IN: CPT

## 2018-08-13 NOTE — MR AVS SNAPSHOT
"                  MRN:[id number]                      After Visit Summary   2018    Tegan Antoine    MRN: [id number]           Visit Information        Provider Department      2018 10:00 AM Nutritionist, [facility]        MyChart Information     MyChart lets you send messages to your doctor, view your test results, renew your prescriptions, schedule appointments and more. To sign up, go to [email or web address] . Click on \"Log in\" on the left side of the screen, which will take you to the Welcome page. Then click on \"Sign up Now\" on the right side of the page.     You will be asked to enter the access code listed below, as well as some personal information. Please follow the directions to create your username and password.     Your access code is: [email or web address]  Expires: 2018 10:31 AM     Your access code will  in 90 days. If you need help or a new code, please call your [place] clinic or [phone].        Care EveryWhere ID     This is your Care EveryWhere ID. This could be used by other organizations to access your [place] medical records  [id number]        Equal Access to Services     DIMITRY TENORIO : Hadii brittani Barfield, wakymda dannielle, qaybta kaalmada rico, sinai wise. So [facility] [phone].    ATENCIÓN: Si habla español, tiene a fuentes disposición servicios gratuitos de asistencia lingüística. Llame al [phone].    We comply with applicable federal civil rights laws and [place] laws. We do not discriminate on the basis of race, color, national origin, age, disability, sex, sexual orientation, or gender identity.            "

## 2018-08-14 VITALS — BODY MASS INDEX: 36.29 KG/M2 | HEIGHT: 61 IN | WEIGHT: 192.2 LBS

## 2018-08-14 NOTE — PROGRESS NOTES
"[facility]  Medical Nutrition Therapy    Visit Type: Initial Assessment    Tegan Antoine referred by Tali Lopez for MNT related to Overweight and recent weight fluctuations.      She lost 50# last year with \"[facility]\" which is an intense exercise program and restricted eating plan that is not evidenced-based.  She thinks she was eating about 1500 calories with very low CHO and high protein.  She weighed 140# at the end of this program. She prefers to eat less animal proteins and injured her knee, so found that this program was not good for her health.  She since has gained the weight back and is 192.2# today.   Recent lipid tests show high LDL. Family Hx of Type 2 DM and heart disease and obesity.       Nutrition Assessment:  Anthropometrics  Height: .   5'1\" BMI:     36   Weight:   192.2#       IBW (kg):  Female:   135#             Nutrition History   For the past few weeks, she has been juicing several cups of veggies and fruit and drinking that.  She isn't sure what \"normal\" nutrition is after what she was told at [facility] but asks several questions about certain diets, vegan eating, meal timing.    She does not take Vit D. Hx of low (14.6 3/13).  Takes B12 and calcium      [facility] of 45 min of cardio.         Nutrition Prescription  Energy:   1,700      Protein:   75         Fluid:   85 oz.      Fat:   65       Carbohydrate:          Fiber:   25            Micronutrient:      Add Vit D 2,000 international units per day                     Food Record   Does not have one today but will start logging again.        Nutrition Diagnosis:   Hx of fad diets and weight loss and regain.  BMI 36    Nutrition Intervention:  Calorie controlled diet 50% CHO, 25% PRO, 25% FAT  Work towards vegan lifestyle per patient choice    Nutrition Goals:  Lose 7% in 6 months  Reduce LDL to normal levels with diet. If levels remain abnormal, discuss with PCP "     Nutrition Follow Up / Monitoring:  She will consider a group like DPP or weight watchers for accountability      Nutrition Recommendations:   Start Vit D 2,000 international units per day    Patient to follow-up with RD in 5 weeks.  Patient has RD contact information to call/email if needed.  Time spent: 60 minutes    KRISTIN K. KLINEFELTER on 8/14/2018 at 8:25 AM

## 2018-11-01 ENCOUNTER — HOSPITAL ENCOUNTER (EMERGENCY)
Facility: HOSPITAL | Age: 28
Discharge: HOME | End: 2018-11-01
Attending: PHYSICIAN ASSISTANT | Admitting: PHYSICIAN ASSISTANT
Payer: COMMERCIAL

## 2018-11-01 VITALS
TEMPERATURE: 98.7 F | OXYGEN SATURATION: 98 % | RESPIRATION RATE: 16 BRPM | DIASTOLIC BLOOD PRESSURE: 84 MMHG | SYSTOLIC BLOOD PRESSURE: 125 MMHG | HEART RATE: 81 BPM

## 2018-11-01 DIAGNOSIS — F33.9 RECURRENT MAJOR DEPRESSIVE DISORDER, REMISSION STATUS UNSPECIFIED (H): ICD-10-CM

## 2018-11-01 DIAGNOSIS — F41.0 ANXIETY ATTACK: ICD-10-CM

## 2018-11-01 LAB
ALBUMIN SERPL-MCNC: 4.2 G/DL (ref 3.4–5)
ALBUMIN UR-MCNC: 100 MG/DL
ALP SERPL-CCNC: 57 U/L (ref 40–150)
ALT SERPL W P-5'-P-CCNC: 17 U/L (ref 0–50)
AMORPH CRY #/AREA URNS HPF: ABNORMAL /HPF
AMPHETAMINES UR QL SCN: NEGATIVE
ANION GAP SERPL CALCULATED.3IONS-SCNC: 6 MMOL/L (ref 3–14)
APAP SERPL-MCNC: <2 MG/L (ref 10–20)
APPEARANCE UR: ABNORMAL
AST SERPL W P-5'-P-CCNC: 12 U/L (ref 0–45)
BACTERIA #/AREA URNS HPF: ABNORMAL /HPF
BARBITURATES UR QL: NEGATIVE
BASOPHILS # BLD AUTO: 0.1 10E9/L (ref 0–0.2)
BASOPHILS NFR BLD AUTO: 0.7 %
BENZODIAZ UR QL: NEGATIVE
BILIRUB SERPL-MCNC: 0.3 MG/DL (ref 0.2–1.3)
BILIRUB UR QL STRIP: NEGATIVE
BUN SERPL-MCNC: 13 MG/DL (ref 7–30)
CALCIUM SERPL-MCNC: 9 MG/DL (ref 8.5–10.1)
CANNABINOIDS UR QL SCN: NEGATIVE
CHLORIDE SERPL-SCNC: 109 MMOL/L (ref 94–109)
CO2 SERPL-SCNC: 25 MMOL/L (ref 20–32)
COCAINE UR QL: NEGATIVE
COLOR UR AUTO: YELLOW
CREAT SERPL-MCNC: 0.54 MG/DL (ref 0.52–1.04)
DIFFERENTIAL METHOD BLD: NORMAL
EOSINOPHIL # BLD AUTO: 0.2 10E9/L (ref 0–0.7)
EOSINOPHIL NFR BLD AUTO: 2.4 %
ERYTHROCYTE [DISTWIDTH] IN BLOOD BY AUTOMATED COUNT: 12.8 % (ref 10–15)
ETHANOL SERPL-MCNC: <0.01 G/DL
GFR SERPL CREATININE-BSD FRML MDRD: >90 ML/MIN/1.7M2
GLUCOSE SERPL-MCNC: 90 MG/DL (ref 70–99)
GLUCOSE UR STRIP-MCNC: NEGATIVE MG/DL
HCG UR QL: NEGATIVE
HCT VFR BLD AUTO: 40.7 % (ref 35–47)
HGB BLD-MCNC: 13.6 G/DL (ref 11.7–15.7)
HGB UR QL STRIP: NEGATIVE
IMM GRANULOCYTES # BLD: 0 10E9/L (ref 0–0.4)
IMM GRANULOCYTES NFR BLD: 0.4 %
KETONES UR STRIP-MCNC: NEGATIVE MG/DL
LEUKOCYTE ESTERASE UR QL STRIP: NEGATIVE
LYMPHOCYTES # BLD AUTO: 2.1 10E9/L (ref 0.8–5.3)
LYMPHOCYTES NFR BLD AUTO: 23.2 %
MCH RBC QN AUTO: 33 PG (ref 26.5–33)
MCHC RBC AUTO-ENTMCNC: 33.4 G/DL (ref 31.5–36.5)
MCV RBC AUTO: 99 FL (ref 78–100)
METHADONE UR QL SCN: NEGATIVE
MONOCYTES # BLD AUTO: 0.8 10E9/L (ref 0–1.3)
MONOCYTES NFR BLD AUTO: 8.6 %
MUCOUS THREADS #/AREA URNS LPF: PRESENT /LPF
NEUTROPHILS # BLD AUTO: 5.9 10E9/L (ref 1.6–8.3)
NEUTROPHILS NFR BLD AUTO: 64.7 %
NITRATE UR QL: NEGATIVE
NRBC # BLD AUTO: 0 10*3/UL
NRBC BLD AUTO-RTO: 0 /100
OPIATES UR QL SCN: NEGATIVE
PCP UR QL SCN: NEGATIVE
PH UR STRIP: 8.5 PH (ref 4.7–8)
PLATELET # BLD AUTO: 317 10E9/L (ref 150–450)
POTASSIUM SERPL-SCNC: 3.9 MMOL/L (ref 3.4–5.3)
PROT SERPL-MCNC: 7.9 G/DL (ref 6.8–8.8)
RBC # BLD AUTO: 4.12 10E12/L (ref 3.8–5.2)
RBC #/AREA URNS AUTO: 1 /HPF (ref 0–2)
SALICYLATES SERPL-MCNC: <2 MG/DL
SODIUM SERPL-SCNC: 140 MMOL/L (ref 133–144)
SOURCE: ABNORMAL
SP GR UR STRIP: 1.03 (ref 1–1.03)
SQUAMOUS #/AREA URNS AUTO: 9 /HPF (ref 0–1)
TSH SERPL DL<=0.005 MIU/L-ACNC: 0.51 MU/L (ref 0.4–4)
UROBILINOGEN UR STRIP-MCNC: 2 MG/DL (ref 0–2)
WBC # BLD AUTO: 9.1 10E9/L (ref 4–11)
WBC #/AREA URNS AUTO: <1 /HPF (ref 0–5)

## 2018-11-01 PROCEDURE — 80307 DRUG TEST PRSMV CHEM ANLYZR: CPT | Performed by: PHYSICIAN ASSISTANT

## 2018-11-01 PROCEDURE — 80053 COMPREHEN METABOLIC PANEL: CPT | Performed by: PHYSICIAN ASSISTANT

## 2018-11-01 PROCEDURE — 81001 URINALYSIS AUTO W/SCOPE: CPT | Performed by: PHYSICIAN ASSISTANT

## 2018-11-01 PROCEDURE — 81025 URINE PREGNANCY TEST: CPT | Performed by: PHYSICIAN ASSISTANT

## 2018-11-01 PROCEDURE — 99284 EMERGENCY DEPT VISIT MOD MDM: CPT | Performed by: PHYSICIAN ASSISTANT

## 2018-11-01 PROCEDURE — 84443 ASSAY THYROID STIM HORMONE: CPT | Performed by: PHYSICIAN ASSISTANT

## 2018-11-01 PROCEDURE — 80320 DRUG SCREEN QUANTALCOHOLS: CPT | Performed by: PHYSICIAN ASSISTANT

## 2018-11-01 PROCEDURE — 36415 COLL VENOUS BLD VENIPUNCTURE: CPT | Performed by: PHYSICIAN ASSISTANT

## 2018-11-01 PROCEDURE — 80329 ANALGESICS NON-OPIOID 1 OR 2: CPT | Performed by: PHYSICIAN ASSISTANT

## 2018-11-01 PROCEDURE — 25000132 ZZH RX MED GY IP 250 OP 250 PS 637: Performed by: PHYSICIAN ASSISTANT

## 2018-11-01 PROCEDURE — 99285 EMERGENCY DEPT VISIT HI MDM: CPT

## 2018-11-01 PROCEDURE — 85025 COMPLETE CBC W/AUTO DIFF WBC: CPT | Performed by: PHYSICIAN ASSISTANT

## 2018-11-01 RX ORDER — LORAZEPAM 1 MG/1
2 TABLET ORAL ONCE
Status: COMPLETED | OUTPATIENT
Start: 2018-11-01 | End: 2018-11-01

## 2018-11-01 RX ORDER — LORAZEPAM 1 MG/1
1 TABLET ORAL EVERY 8 HOURS PRN
Qty: 15 TABLET | Refills: 0 | Status: SHIPPED | OUTPATIENT
Start: 2018-11-01

## 2018-11-01 RX ADMIN — LORAZEPAM 2 MG: 1 TABLET ORAL at 19:10

## 2018-11-01 ASSESSMENT — ENCOUNTER SYMPTOMS
NERVOUS/ANXIOUS: 1
SLEEP DISTURBANCE: 1

## 2018-11-01 NOTE — ED AVS SNAPSHOT
[facility]    [address] [address]    Phone:  [phone]                                       Tegan Antoine   MRN: [id number]    Department:  [facility]   Date of Visit:  11/1/2018           After Visit Summary Signature Page     I have received my discharge instructions, and my questions have been answered. I have discussed any challenges I see with this plan with the nurse or doctor.    ..........................................................................................................................................  Patient/Patient Representative Signature      ..........................................................................................................................................  Patient Representative Print Name and Relationship to Patient    ..................................................               ................................................  Date                                   Time    ..........................................................................................................................................  Reviewed by Signature/Title    ...................................................              ..............................................  Date                                               Time          22EPIC Rev 08/18

## 2018-11-01 NOTE — ED AVS SNAPSHOT
[facility]    [address] [address]    Phone:  [phone]                                       Tegan Antoine   MRN: [id number]    Department:  [facility]   Date of Visit:  11/1/2018           Patient Information     Date Of Birth          1990        Your diagnoses for this visit were:     Anxiety attack     Recurrent major depressive disorder, remission status unspecified (H)        You were seen by Ching Christina PA-C.      Follow-up Information     Follow up with Kellie Guardado MD In 1 week.    Specialty:  Family Practice    Contact information:    [address]  [facility] [id number] [phone]          Follow up with [facility].    Specialty:  EMERGENCY MEDICINE    Why:  If symptoms worsen    Contact information:    [address] [address] [phone]    Additional information:    From [place]: Take [address]. Turn left at [address]. Turn left at the first stoplight on [address]. At the first stop sign, take a right onto [address]. Take a left into the parking lot and continue through until you reach the North enterance of the building.       From [place]: Take [address]. Take the [address] ramp towards [place]. Turn left onto [address]. Take a slight right onto [address]. Turn left at the first stoplight on [address]. At the first stop sign, take a right onto [address]. Take a left into the parking lot and continue through until you reach the North enterance of the building.       From [place]: Take [address]. Take a right at [address]. At the first stop sign, take a right onto [address]. Take a left into the parking lot and continue through until you reach the North enterance of the building.         Discharge Instructions       Go to the [facility] as discussed. Please return  here with any new or worsening symptoms.         Anxiety Reaction  Anxiety is the feeling we all get when we think something bad might happen. It is a normal response to stress and usually causes only a mild reaction. When anxiety becomes more severe, it can interfere with daily life. In some cases, you may not even be aware of what it is you re anxious about. There may also be a genetic link or it may be a learned behavior in the home.  Both psychological and physical triggers cause stress reaction. It's often a response to fear or emotional stress, real or imagined. This stress may come from home, family, work, or social relationships.  During an anxiety reaction, you may feel:    Helpless    Nervous    Depressed    Irritable  Your body may show signs of anxiety in many ways. You may experience:    Dry mouth    Shakiness    Dizziness    Weakness    Trouble breathing    Breathing fast (hyperventilating)    Chest pressure    Sweating    Headache    Nausea    Diarrhea    Tiredness    Inability to sleep    Sexual problems  Home care    Try to locate the sources of stress in your life. They may not be obvious. These may include:  ? Daily hassles of life (such as traffic jams, missed appointments, or car troubles)  ? Major life changes, both good (new baby or job promotion) and bad (loss of job or loss of loved one)  ? Overload: feeling that you have too many responsibilities and can't take care of all of them at once  ? Feeling helpless or feeling that your problems are beyond what you re able to solve    Notice how your body reacts to stress. Learn to listen to your body signals. This will help you take action before the stress becomes severe.    When you can, do something about the source of your stress. (Avoid hassles, limit the amount of change that happens in your life at one time and take a break when you feel overloaded).    Unfortunately, many stressful situations can't be avoided. It is necessary to learn how  to better manage stress. There are many proven methods that will reduce your anxiety. These include simple things like exercise, good nutrition, and adequate rest. Also, there are certain techniques that are helpful:  ? Relaxation  ? Breathing exercises  ? Visualization  ? Biofeedback  ? Meditation  For more information about this, consult your healthcare provider or go to a local bookstore and review the many books and tapes available on this subject.  Follow-up care  If you feel that your anxiety is not responding to self-help measures, contact your healthcare provider or make an appointment with a counselor. You may need short-term psychological counseling and temporary medicine to help you manage stress.  Call 911  Call 911 if any of these happen:    Trouble breathing    Confusion    Drowsiness or trouble wakening    Fainting or loss of consciousness    Rapid heart rate    Seizure    New chest pain that becomes more severe, lasts longer, or spreads into your shoulder, arm, neck, jaw, or back  When to seek medical advice  Call your healthcare provider right away if any of these happen:    Your symptoms get worse    Severe headache not relieved by rest and mild pain reliever  Date Last Reviewed: 10/1/2017    [id number] [employer]. [address] [place] [address]. All rights reserved. This information is not intended as a substitute for professional medical care. Always follow your healthcare professional's instructions.          Your next 10 appointments already scheduled     Nov 02, 2018 11:15 AM CDT   Office Visit with Olivier Romero MD   [facility] ([facility])    [address]  [place] [address] [phone]           Bring a current list of meds and any records pertaining to this visit. For Physicals, please bring immunization records and any forms needing to be filled out. Please arrive 10 minutes early to complete  paperwork.                 Review of your medicines      START taking        Dose / Directions Last dose taken    LORazepam 1 MG tablet   Commonly known as:  ATIVAN   Dose:  1 mg   Quantity:  15 tablet        Take 1 tablet (1 mg) by mouth every 8 hours as needed for anxiety Do not operate a vehicle after taking this medication   Refills:  0          Our records show that you are taking the medicines listed below. If these are incorrect, please call your family doctor or clinic.        Dose / Directions Last dose taken    gabapentin 300 MG capsule   Commonly known as:  NEURONTIN   Dose:  300 mg        Take 300 mg by mouth 3 times daily 2 capsules at 0900 2 capsules at 1300 1 capsule at bedtime   Refills:  6        SYMBICORT 80-4.5 MCG/ACT Inhaler   Generic drug:  budesonide-formoterol        INHALE 2 PUFFS PO BID. WHILE IN PERU.   Refills:  0        VENTOLIN  (90 Base) MCG/ACT inhaler   Generic drug:  albuterol        as needed   Refills:  3                Prescriptions were sent or printed at these locations (1 Prescription)                   [facility] [address] [place], [place] - [address]  AT [address] OF [facility] [facility] [address] [facility]   [address] [facility] [address]    Telephone:  [phone]   Fax:  [phone]   Hours:                  Printed at Department/Unit printer (1 of 1)         LORazepam (ATIVAN) 1 MG tablet                Procedures and tests performed during your visit     Acetaminophen level    Alcohol ethyl    CBC with platelets differential    Comprehensive metabolic panel    Drug Screen Urine (Range)    HCG qualitative urine    Salicylate level    TSH    UA reflex to Microscopic and Culture      Orders Needing Specimen Collection     None      Pending Results     Date and Time Order Name Status Description    11/1/2018 1759 Drug Screen Urine (Range) In process             Pending Culture Results     Date and Time Order Name Status Description    11/1/2018 1759 Drug Screen Urine ([facility])  "In process             Thank you for choosing [facility]       Thank you for choosing [facility] for your care. Our goal is always to provide you with excellent care. Hearing back from our patients is one way we can continue to improve our services. Please take a few minutes to complete the written survey that you may receive in the mail after you visit with us. Thank you!        [employer] Information     Keep Your Pharmacy Open lets you send messages to your doctor, view your test results, renew your prescriptions, schedule appointments and more. To sign up, go to [email or web address] . Click on \"Log in\" on the left side of the screen, which will take you to the Welcome page. Then click on \"Sign up Now\" on the right side of the page.     You will be asked to enter the access code listed below, as well as some personal information. Please follow the directions to create your username and password.     Your access code is: [email or web address]  Expires: 2019  8:04 PM     Your access code will  in 90 days. If you need help or a new code, please call your [facility] clinic or [phone].        Care EveryWhere ID     This is your Care EveryWhere ID. This could be used by other organizations to access your [facility] medical records  [id number]        Equal Access to Services     DIMITRY TENORIO : Hadii brittani seymouro Soenali, waaxda luqadaha, qaybta kaalmada adeegyada, sinai wise. So [facility] [place] [facility] [phone].    ATENCIÓN: Si habla español, tiene a fuentes disposición servicios gratuitos de asistencia lingüística. Llame al [phone].    We comply with applicable federal civil rights laws and [place] laws. We do not discriminate on the basis of race, color, national origin, age, disability, sex, sexual orientation, or gender identity.            After Visit Summary       This is your record. Keep this with you and show to your community pharmacist(s) and doctor(s) at your next visit.                  "

## 2018-11-01 NOTE — ED NOTES
CONNIE Valdez at DEC.  Information given regarding patient's ED visit and request for Ativan until appt on 11/13.  Patient calm and cooperative at this time.

## 2018-11-01 NOTE — ED NOTES
Ambulated to room 4 independently, call light in reach.  Appt on 11/13 for mental health appt.  Having constant panic attacks.  Prescribed Ativan 2 mg daily in the past for anxiety.  Last used Ativan in February, stopped the medication because was feeling better.  Requesting Rx for Ativan until appt. Denies pain at this time.  Denies SI or HI.  Tearful, calm and cooperative at this time.

## 2018-11-02 NOTE — DISCHARGE INSTRUCTIONS
Go to the [facility] as discussed. Please return here with any new or worsening symptoms.         Anxiety Reaction  Anxiety is the feeling we all get when we think something bad might happen. It is a normal response to stress and usually causes only a mild reaction. When anxiety becomes more severe, it can interfere with daily life. In some cases, you may not even be aware of what it is you re anxious about. There may also be a genetic link or it may be a learned behavior in the home.  Both psychological and physical triggers cause stress reaction. It's often a response to fear or emotional stress, real or imagined. This stress may come from home, family, work, or social relationships.  During an anxiety reaction, you may feel:    Helpless    Nervous    Depressed    Irritable  Your body may show signs of anxiety in many ways. You may experience:    Dry mouth    Shakiness    Dizziness    Weakness    Trouble breathing    Breathing fast (hyperventilating)    Chest pressure    Sweating    Headache    Nausea    Diarrhea    Tiredness    Inability to sleep    Sexual problems  Home care    Try to locate the sources of stress in your life. They may not be obvious. These may include:  ? Daily hassles of life (such as traffic jams, missed appointments, or car troubles)  ? Major life changes, both good (new baby or job promotion) and bad (loss of job or loss of loved one)  ? Overload: feeling that you have too many responsibilities and can't take care of all of them at once  ? Feeling helpless or feeling that your problems are beyond what you re able to solve    Notice how your body reacts to stress. Learn to listen to your body signals. This will help you take action before the stress becomes severe.    When you can, do something about the source of your stress. (Avoid hassles, limit the amount of change that happens in your life at one time and take a break when you feel overloaded).    Unfortunately, many stressful  situations can't be avoided. It is necessary to learn how to better manage stress. There are many proven methods that will reduce your anxiety. These include simple things like exercise, good nutrition, and adequate rest. Also, there are certain techniques that are helpful:  ? Relaxation  ? Breathing exercises  ? Visualization  ? Biofeedback  ? Meditation  For more information about this, consult your healthcare provider or go to a local bookstore and review the many books and tapes available on this subject.  Follow-up care  If you feel that your anxiety is not responding to self-help measures, contact your healthcare provider or make an appointment with a counselor. You may need short-term psychological counseling and temporary medicine to help you manage stress.  Call 911  Call 911 if any of these happen:    Trouble breathing    Confusion    Drowsiness or trouble wakening    Fainting or loss of consciousness    Rapid heart rate    Seizure    New chest pain that becomes more severe, lasts longer, or spreads into your shoulder, arm, neck, jaw, or back  When to seek medical advice  Call your healthcare provider right away if any of these happen:    Your symptoms get worse    Severe headache not relieved by rest and mild pain reliever  Date Last Reviewed: 10/1/2017    [id number] [employer]. [address] [place] [address], [place] [address]. All rights reserved. This information is not intended as a substitute for professional medical care. Always follow your healthcare professional's instructions.

## 2018-11-02 NOTE — ED NOTES
Face to face report given with opportunity to observe patient.    Report given to ANDREWS Hager   11/1/2018  7:04 PM

## 2018-11-02 NOTE — ED PROVIDER NOTES
"  History     Chief Complaint   Patient presents with     Medication Refill     wants to have \"mental health\" medications filled, unable to get PCP to fill medication     Anxiety     HPI  Tegan Antoine is a 27 year old female who presents with increased anxiety and depression x 1 week. She is very tearful and shaky. Denies SI/HI. States she ran out of her ativan this week. See's a counselor, unsure when next session is.     Problem List:    Patient Active Problem List    Diagnosis Date Noted     Obesity 02/15/2018     Priority: Medium     Overview:   BMI - 31       Tobacco dependence 2017     Priority: Medium     Vitamin D deficiency 2013     Priority: Medium     Asthma 09/15/2010     Priority: Medium     Overview:   intermittent/allergen          Past Medical History:    Past Medical History:   Diagnosis Date     Other abnormal glucose      Other specified postprocedural states      Personal history of other medical treatment (CODE)      Personal history of other medical treatment (CODE)      Personal history of other medical treatment (CODE)        Past Surgical History:    Past Surgical History:   Procedure Laterality Date      SECTION      12/13/10     COLPOSCOPY, BIOPSY, COMBINED             Family History:    Family History   Problem Relation Age of Onset     Other - See Comments Daughter      No Known Problems     HEART DISEASE Father      Heart Disease,Arrhythmia     Other - See Comments Sister      Psychiatric illness,Depression-currently being treated, resolved     Diabetes Mother      Diabetes     Other - See Comments Maternal Grandmother      Alzheimer's     Cancer Paternal Grandmother      Cancer     Prostate Cancer Maternal Uncle      Cancer-prostate       Social History:  Marital Status:   [2]  Social History   Substance Use Topics     Smoking status: Current Every Day Smoker     Packs/day: 0.50     Years: 8.00     Types: Cigarettes     Last attempt to quit: 1/15/2016 "     Smokeless tobacco: Never Used     Alcohol use No      Comment: Alcoholic Drinks/day: rare        Medications:      LORazepam (ATIVAN) 1 MG tablet   gabapentin (NEURONTIN) 300 MG capsule   SYMBICORT 80-4.5 MCG/ACT Inhaler   VENTOLIN  (90 Base) MCG/ACT Inhaler         Review of Systems   Psychiatric/Behavioral: Positive for sleep disturbance. Negative for suicidal ideas. The patient is nervous/anxious.    All other systems reviewed and are negative.      Physical Exam   BP: 144/91  Pulse: 114  Temp: 99  F (37.2  C)  Resp: 14  SpO2: 99 %      Physical Exam   Constitutional: She is oriented to person, place, and time. She appears well-developed and well-nourished. No distress.   HENT:   Head: Normocephalic and atraumatic.   Right Ear: External ear normal.   Left Ear: External ear normal.   Nose: Nose normal.   Mouth/Throat: Oropharynx is clear and moist. No oropharyngeal exudate.   Eyes: Conjunctivae and EOM are normal. Pupils are equal, round, and reactive to light. Right eye exhibits no discharge. Left eye exhibits no discharge. No scleral icterus.   Neck: Normal range of motion. Neck supple.   Cardiovascular: Normal rate, regular rhythm, normal heart sounds and intact distal pulses.  Exam reveals no gallop and no friction rub.    No murmur heard.  Pulmonary/Chest: Effort normal and breath sounds normal. No respiratory distress. She has no wheezes. She has no rales. She exhibits no tenderness.   Abdominal: Soft. Bowel sounds are normal. There is no tenderness.   Musculoskeletal: She exhibits no edema.   Lymphadenopathy:     She has no cervical adenopathy.   Neurological: She is alert and oriented to person, place, and time. No cranial nerve deficit. Coordination normal.   Skin: Skin is warm and dry. No rash noted. She is not diaphoretic. No erythema. No pallor.   Psychiatric: Judgment and thought content normal. Her mood appears anxious. She is withdrawn. Cognition and memory are normal. She exhibits a  depressed mood. She expresses no homicidal and no suicidal ideation. She expresses no suicidal plans and no homicidal plans.   Tearful. Shaking.    Nursing note and vitals reviewed.      ED Course     ED Course     Procedures          {    Results for orders placed or performed during the hospital encounter of 11/01/18 (from the past 24 hour(s))   UA reflex to Microscopic and Culture   Result Value Ref Range    Color Urine Yellow     Appearance Urine Slightly Cloudy     Glucose Urine Negative NEG^Negative mg/dL    Bilirubin Urine Negative NEG^Negative    Ketones Urine Negative NEG^Negative mg/dL    Specific Gravity Urine 1.026 1.003 - 1.035    Blood Urine Negative NEG^Negative    pH Urine 8.5 (H) 4.7 - 8.0 pH    Protein Albumin Urine 100 (A) NEG^Negative mg/dL    Urobilinogen mg/dL 2.0 0.0 - 2.0 mg/dL    Nitrite Urine Negative NEG^Negative    Leukocyte Esterase Urine Negative NEG^Negative    Source Midstream Urine     RBC Urine 1 0 - 2 /HPF    WBC Urine <1 0 - 5 /HPF    Bacteria Urine None (A) NEG^Negative /HPF    Squamous Epithelial /HPF Urine 9 (H) 0 - 1 /HPF    Mucous Urine Present (A) NEG^Negative /LPF    Amorphous Crystals Few (A) NEG^Negative /HPF   Drug Screen Urine (Range)   Result Value Ref Range    Amphetamine Qual Urine Negative NEG^Negative    Barbiturates Qual Urine Negative NEG^Negative    Benzodiazepine Qual Urine Negative NEG^Negative    Cannabinoids Qual Urine Negative NEG^Negative    Cocaine Qual Urine Negative NEG^Negative    Opiates Qualitative Urine Negative NEG^Negative    Methadone Qual Urine Negative NEG^Negative    PCP Qual Urine Negative NEG^Negative   HCG qualitative urine   Result Value Ref Range    HCG Qual Urine Negative NEG^Negative   Acetaminophen level   Result Value Ref Range    Acetaminophen Level <2 mg/L   Alcohol ethyl   Result Value Ref Range    Ethanol g/dL <0.01 0.01 g/dL   CBC with platelets differential   Result Value Ref Range    WBC 9.1 4.0 - 11.0 10e9/L    RBC Count 4.12  3.8 - 5.2 10e12/L    Hemoglobin 13.6 11.7 - 15.7 g/dL    Hematocrit 40.7 35.0 - 47.0 %    MCV 99 78 - 100 fl    MCH 33.0 26.5 - 33.0 pg    MCHC 33.4 31.5 - 36.5 g/dL    RDW 12.8 10.0 - 15.0 %    Platelet Count 317 150 - 450 10e9/L    Diff Method Automated Method     % Neutrophils 64.7 %    % Lymphocytes 23.2 %    % Monocytes 8.6 %    % Eosinophils 2.4 %    % Basophils 0.7 %    % Immature Granulocytes 0.4 %    Nucleated RBCs 0 0 /100    Absolute Neutrophil 5.9 1.6 - 8.3 10e9/L    Absolute Lymphocytes 2.1 0.8 - 5.3 10e9/L    Absolute Monocytes 0.8 0.0 - 1.3 10e9/L    Absolute Eosinophils 0.2 0.0 - 0.7 10e9/L    Absolute Basophils 0.1 0.0 - 0.2 10e9/L    Abs Immature Granulocytes 0.0 0 - 0.4 10e9/L    Absolute Nucleated RBC 0.0    Comprehensive metabolic panel   Result Value Ref Range    Sodium 140 133 - 144 mmol/L    Potassium 3.9 3.4 - 5.3 mmol/L    Chloride 109 94 - 109 mmol/L    Carbon Dioxide 25 20 - 32 mmol/L    Anion Gap 6 3 - 14 mmol/L    Glucose 90 70 - 99 mg/dL    Urea Nitrogen 13 7 - 30 mg/dL    Creatinine 0.54 0.52 - 1.04 mg/dL    GFR Estimate >90 >60 mL/min/1.7m2    GFR Estimate If Black >90 >60 mL/min/1.7m2    Calcium 9.0 8.5 - 10.1 mg/dL    Bilirubin Total 0.3 0.2 - 1.3 mg/dL    Albumin 4.2 3.4 - 5.0 g/dL    Protein Total 7.9 6.8 - 8.8 g/dL    Alkaline Phosphatase 57 40 - 150 U/L    ALT 17 0 - 50 U/L    AST 12 0 - 45 U/L   TSH   Result Value Ref Range    TSH 0.51 0.40 - 4.00 mU/L   Salicylate level   Result Value Ref Range    Salicylate Level <2 mg/dL       Medications   LORazepam (ATIVAN) tablet 2 mg (2 mg Oral Given 11/1/18 1910)       Assessments & Plan (with Medical Decision Making)   Pt had a DEC evaluation performed. They did not feel inpatient admission was needed and I agree. Pt is very tearful, depressed, withdrawn and requests help. I spoke with her about [facility]. She agrees to admission to [facility] and was accepted there. She was given Ativan 2mg PO for anxiety attack. RX for Ativan  1mg PO #15 was given. Her  will be driving her there. She is very happy with this plan and was discharged in improved condition.     Plan: Go to the [facility] as discussed. Please return here with any new or worsening symptoms.     I have reviewed the nursing notes.    I have reviewed the findings, diagnosis, plan and need for follow up with the patient.    Discharge Medication List as of 11/1/2018  8:04 PM      START taking these medications    Details   LORazepam (ATIVAN) 1 MG tablet Take 1 tablet (1 mg) by mouth every 8 hours as needed for anxiety Do not operate a vehicle after taking this medication, Disp-15 tablet, R-0, Local Print             Final diagnoses:   Anxiety attack   Recurrent major depressive disorder, remission status unspecified (H)       11/1/2018   [facility]     Ching Christina PA-C  11/01/18 1009

## 2018-11-02 NOTE — ED NOTES
Patient currently speaking to [facility].  Will give Ativan to patient when she completes the call.

## 2018-12-07 ENCOUNTER — OFFICE VISIT (OUTPATIENT)
Dept: FAMILY MEDICINE | Facility: OTHER | Age: 28
End: 2018-12-07
Attending: NURSE PRACTITIONER

## 2018-12-07 VITALS
DIASTOLIC BLOOD PRESSURE: 64 MMHG | WEIGHT: 198.2 LBS | OXYGEN SATURATION: 98 % | TEMPERATURE: 98.6 F | BODY MASS INDEX: 37.45 KG/M2 | HEART RATE: 97 BPM | SYSTOLIC BLOOD PRESSURE: 120 MMHG

## 2018-12-07 DIAGNOSIS — J03.90 TONSILLITIS: Primary | ICD-10-CM

## 2018-12-07 LAB
ALBUMIN SERPL-MCNC: 4.1 G/DL (ref 3.5–5.7)
ALP SERPL-CCNC: 58 U/L (ref 34–104)
ALT SERPL W P-5'-P-CCNC: 23 U/L (ref 7–52)
ANION GAP SERPL CALCULATED.3IONS-SCNC: 6 MMOL/L (ref 3–14)
AST SERPL W P-5'-P-CCNC: 12 U/L (ref 13–39)
BASOPHILS # BLD AUTO: 0 10E9/L (ref 0–0.2)
BASOPHILS NFR BLD AUTO: 0.3 %
BILIRUB SERPL-MCNC: 0.3 MG/DL (ref 0.3–1)
BUN SERPL-MCNC: 6 MG/DL (ref 7–25)
CALCIUM SERPL-MCNC: 9.1 MG/DL (ref 8.6–10.3)
CHLORIDE SERPL-SCNC: 104 MMOL/L (ref 98–107)
CO2 SERPL-SCNC: 27 MMOL/L (ref 21–31)
CREAT SERPL-MCNC: 0.59 MG/DL (ref 0.6–1.2)
DEPRECATED S PYO AG THROAT QL EIA: NORMAL
DIFFERENTIAL METHOD BLD: ABNORMAL
EOSINOPHIL # BLD AUTO: 0.1 10E9/L (ref 0–0.7)
EOSINOPHIL NFR BLD AUTO: 0.8 %
ERYTHROCYTE [DISTWIDTH] IN BLOOD BY AUTOMATED COUNT: 12.6 % (ref 10–15)
GFR SERPL CREATININE-BSD FRML MDRD: >90 ML/MIN/1.7M2
GLUCOSE SERPL-MCNC: 101 MG/DL (ref 70–105)
HCT VFR BLD AUTO: 37.8 % (ref 35–47)
HETEROPH AB SER QL: NEGATIVE
HGB BLD-MCNC: 12.6 G/DL (ref 11.7–15.7)
IMM GRANULOCYTES # BLD: 0.1 10E9/L (ref 0–0.4)
IMM GRANULOCYTES NFR BLD: 0.5 %
LYMPHOCYTES # BLD AUTO: 1.8 10E9/L (ref 0.8–5.3)
LYMPHOCYTES NFR BLD AUTO: 14.6 %
MCH RBC QN AUTO: 32.6 PG (ref 26.5–33)
MCHC RBC AUTO-ENTMCNC: 33.3 G/DL (ref 31.5–36.5)
MCV RBC AUTO: 98 FL (ref 78–100)
MONOCYTES # BLD AUTO: 1.1 10E9/L (ref 0–1.3)
MONOCYTES NFR BLD AUTO: 9.2 %
NEUTROPHILS # BLD AUTO: 9.1 10E9/L (ref 1.6–8.3)
NEUTROPHILS NFR BLD AUTO: 74.6 %
PLATELET # BLD AUTO: 220 10E9/L (ref 150–450)
POTASSIUM SERPL-SCNC: 4.2 MMOL/L (ref 3.5–5.1)
PROT SERPL-MCNC: 7.4 G/DL (ref 6.4–8.9)
RBC # BLD AUTO: 3.86 10E12/L (ref 3.8–5.2)
SODIUM SERPL-SCNC: 137 MMOL/L (ref 134–144)
SPECIMEN SOURCE: NORMAL
WBC # BLD AUTO: 12.1 10E9/L (ref 4–11)

## 2018-12-07 PROCEDURE — 80053 COMPREHEN METABOLIC PANEL: CPT | Performed by: NURSE PRACTITIONER

## 2018-12-07 PROCEDURE — 36415 COLL VENOUS BLD VENIPUNCTURE: CPT | Performed by: NURSE PRACTITIONER

## 2018-12-07 PROCEDURE — 87081 CULTURE SCREEN ONLY: CPT | Performed by: NURSE PRACTITIONER

## 2018-12-07 PROCEDURE — 85025 COMPLETE CBC W/AUTO DIFF WBC: CPT | Performed by: NURSE PRACTITIONER

## 2018-12-07 PROCEDURE — 86308 HETEROPHILE ANTIBODY SCREEN: CPT | Performed by: NURSE PRACTITIONER

## 2018-12-07 PROCEDURE — 99214 OFFICE O/P EST MOD 30 MIN: CPT | Performed by: NURSE PRACTITIONER

## 2018-12-07 PROCEDURE — 87880 STREP A ASSAY W/OPTIC: CPT | Performed by: NURSE PRACTITIONER

## 2018-12-07 ASSESSMENT — ENCOUNTER SYMPTOMS
SORE THROAT: 1
COUGH: 0
FEVER: 1
ABDOMINAL PAIN: 0
MUSCULOSKELETAL NEGATIVE: 1

## 2018-12-07 NOTE — MR AVS SNAPSHOT
After Visit Summary   12/7/2018    Tegan Antoine    MRN: [id number]           Patient Information     Date Of Birth          1990        Visit Information        Provider Department      12/7/2018 12:15 PM Meg Arnold NP [facility]        Today's Diagnoses     Tonsillitis    -  1      Care Instructions      Plan: The rapid strep was negative, the strep culture is pending, we will call you with results in 24-48 hours and treat with antibiotics as needed  You're mono was negative, the rest of your labs were fairly benign.   For pain:   Warm salt water gargles several times per day  Ibuprofen and tylenol per package directions for pain/fever  Cepacol lozenges OTC per package directions  Increase fluids, wash hands frequently, rest  Go to ED if symptoms worsen.   Follow up with your Primary Care provider if symptoms do not improve in 2-3 days      When You Have a Sore Throat    A sore throat can be painful. There are many reasons why you may have a sore throat. Your healthcare provider will work with you to find the cause of your sore throat. He or she will also find the best treatment for you.  What causes a sore throat?  Sore throats can be caused or worsened by:    Cold or flu viruses    Bacteria    Irritants such as tobacco smoke or air pollution    Acid reflux  A healthy throat  The tonsils are on the sides of the throat near the base of the tongue. They collect viruses and bacteria and help fight infection. The throat (pharynx) is the passage for air. Mucus from the nasal cavity also moves down the passage.  An inflamed throat  The tonsils and pharynx can become inflamed due to a cold or flu virus. Postnasal drip (excess mucus draining from the nasal cavity) can irritate the throat. It can also make the throat or tonsils more likely to be infected by bacteria. Severe, untreated tonsillitis in children or adults can cause a pocket of pus (abscess) to form near  the tonsil.  Your evaluation  A medical evaluation can help find the cause of your sore throat. It can also help your healthcare provider choose the best treatment for you. The evaluation may include a health history, physical exam, and diagnostic tests.  Health history  Your healthcare provider may ask you the following:    How long has the sore throat lasted and how have you been treating it?    Do you have any other symptoms, such as body aches, fever, or cough?    Does your sore throat recur? If so, how often? How many days of school or work have you missed because of a sore throat?    Do you have trouble eating or swallowing?    Have you been told that you snore or have other sleep problems?    Do you have bad breath?    Do you cough up bad-tasting mucus?  Physical exam  During the exam, your healthcare provider checks your ears, nose, and throat for problems. He or she also checks for swelling in the neck, and may listen to your chest.  Possible tests  Other tests your healthcare provider may perform include:    A throat swab to check for bacteria such as streptococcus (the bacteria that causes strep throat)    A blood test to check for mononucleosis (a viral infection)    A chest X-ray to rule out pneumonia, especially if you have a cough  Treating a sore throat  Treatment depends on many factors. What is the likely cause? Is the problem recent? Does it keep coming back? In many cases, the best thing to do is to treat the symptoms, rest, and let the problem heal itself. Antibiotics may help clear up some bacterial infections. For cases of severe or recurring tonsillitis, the tonsils may need to be removed.  Relieving your symptoms    Don t smoke, and avoid secondhand smoke.    For children, try throat sprays or Popsicles. Adults and older children may try lozenges.    Drink warm liquids to soothe the throat and help thin mucus. Avoid alcohol, spicy foods, and acidic drinks such as orange juice. These can  "irritate the throat.    Gargle with warm saltwater (1 teaspoon of salt to 8 ounces of warm water).    Use a humidifier to keep air moist and relieve throat dryness.    Try over-the-counter pain relievers such as acetaminophen or ibuprofen. Use as directed, and don t exceed the recommended dose. Don t give aspirin to children.   Are antibiotics needed?  If your sore throat is due to a bacterial infection, antibiotics may speed healing and prevent complications. Although group A streptococcus (\"strep throat\" or GAS) is the major treatable infection for a sore throat, GAS causes only 5% to 15% of sore throats in adults who seek medical care. Most sore throats are caused by cold or flu viruses. And antibiotics don t treat viral illness. In fact, using antibiotics when they re not needed may produce bacteria that are harder to kill. Your healthcare provider will prescribe antibiotics only if he or she thinks they are likely to help.  If antibiotics are prescribed  Take the medicine exactly as directed. Be sure to finish your prescription even if you re feeling better. And be sure to ask your healthcare provider or pharmacist what side effects are common and what to do about them.  Is surgery needed?  In some cases, tonsils need to be removed. This is often done as outpatient (same-day) surgery. Your healthcare provider may advise removing the tonsils in cases of:    Several severe bouts of tonsillitis in a year.  Severe  episodes include those that lead to missed days of school or work, or that need to be treated with antibiotics.    Tonsillitis that causes breathing problems during sleep    Tonsillitis caused by food particles collecting in pouches in the tonsils (cryptic tonsillitis)  Call your healthcare provider if any of the following occur:    Symptoms worsen, or new symptoms develop.    Swollen tonsils make breathing difficult.    The pain is severe enough to keep you from drinking liquids.    A skin rash, hives, " "or wheezing develops. Any of these could signal an allergic reaction to antibiotics.    Symptoms don t improve within a week.    Symptoms don t improve within 2 to 3 days of starting antibiotics.   Date Last Reviewed: 10/1/2016    7818-9308 [employer]. [address] [place] [address], [place] [address]. All rights reserved. This information is not intended as a substitute for professional medical care. Always follow your healthcare professional's instructions.                Follow-ups after your visit        Follow-up notes from your care team     Return if symptoms worsen or fail to improve.      Who to contact     If you have questions or need follow up information about today's clinic visit or your schedule please contact [facility] directly at [phone].  Normal or non-critical lab and imaging results will be communicated to you by [employer], letter or phone within 4 business days after the clinic has received the results. If you do not hear from us within 7 days, please contact the clinic through [employer] or phone. If you have a critical or abnormal lab result, we will notify you by phone as soon as possible.  Submit refill requests through [employer] or call your pharmacy and they will forward the refill request to us. Please allow 3 business days for your refill to be completed.          Additional Information About Your Visit        [employer] Information     [employer] lets you send messages to your doctor, view your test results, renew your prescriptions, schedule appointments and more. To sign up, go to [email or web address] . Click on \"Log in\" on the left side of the screen, which will take you to the Welcome page. Then click on \"Sign up Now\" on the right side of the page.     You will be asked to enter the access code listed below, as well as some personal information. Please follow the directions to create your username and password.     Your access code is: [email or web address]  Expires: " 2019  7:04 PM     Your access code will  in 90 days. If you need help or a new code, please call your [facility] clinic or [phone].        Care EveryWhere ID     This is your Care EveryWhere ID. This could be used by other organizations to access your [facility] medical records  [id number]        Your Vitals Were     Pulse Temperature Last Period Pulse Oximetry Breastfeeding? BMI (Body Mass Index)    97 98.6  F (37  C) (Tympanic) (LMP Unknown) 98% No 37.45 kg/m2       Blood Pressure from Last 3 Encounters:   18 120/64   18 125/84   18 102/70    Weight from Last 3 Encounters:   18 198 lb 3.2 oz (89.9 kg)   18 192 lb 3.2 oz (87.2 kg)   18 193 lb (87.5 kg)              We Performed the Following     CBC and Differential     Comprehensive Metabolic Panel     Mononucleosis screen (Heterophile)     Rapid strep screen     Throat Strep A Culture        Primary Care Provider Office Phone # Fax #    Kellie NEVILLE David Short [phone]       [address] [facility] [address]        Equal Access to Services     SUSANA TENORIO AH: Hadii brittani aadme hadasho Soomaali, waaxda luqadaha, qaybta kaalmada adeegyada, sinai wise. So [facility] [phone].    ATENCIÓN: Si habla español, tiene a fuentes disposición servicios gratuitos de asistencia lingüística. Llame al [phone].    We comply with applicable federal civil rights laws and [place] laws. We do not discriminate on the basis of race, color, national origin, age, disability, sex, sexual orientation, or gender identity.            Thank you!     Thank you for choosing [facility]  for your care. Our goal is always to provide you with excellent care. Hearing back from our patients is one way we can continue to improve our services. Please take a few minutes to complete the written survey that you may receive in the mail after your visit with us. Thank you!              Your Updated Medication List - Protect others around you: Learn how to safely use, store and throw away your medicines at www.disposemymeds.org.          This list is accurate as of 12/7/18  1:36 PM.  Always use your most recent med list.                   Brand Name Dispense Instructions for use Diagnosis    gabapentin 300 MG capsule    NEURONTIN     Take 300 mg by mouth 3 times daily 2 capsules at 0900 2 capsules at 1300 1 capsule at bedtime        LORazepam 1 MG tablet    ATIVAN    15 tablet    Take 1 tablet (1 mg) by mouth every 8 hours as needed for anxiety Do not operate a vehicle after taking this medication        SYMBICORT 80-4.5 MCG/ACT Inhaler   Generic drug:  budesonide-formoterol      INHALE 2 PUFFS PO BID. WHILE IN PERU.        VENTOLIN  (90 Base) MCG/ACT inhaler   Generic drug:  albuterol      as needed

## 2018-12-07 NOTE — NURSING NOTE
Patient presents to clinic today for possible strep. Patient has a sore throat, headache, fever, bilateral ear pain.  Elizabeth Patel CMA..............12/7/2018........12:18 PM    Medication Reconciliation: complete    Elizabeth Patel CMA

## 2018-12-09 LAB
BACTERIA SPEC CULT: NORMAL
SPECIMEN SOURCE: NORMAL

## 2018-12-10 ENCOUNTER — NURSE TRIAGE (OUTPATIENT)
Dept: FAMILY MEDICINE | Facility: OTHER | Age: 28
End: 2018-12-10

## 2018-12-10 ENCOUNTER — OFFICE VISIT (OUTPATIENT)
Dept: FAMILY MEDICINE | Facility: OTHER | Age: 28
End: 2018-12-10
Attending: PHYSICIAN ASSISTANT

## 2018-12-10 VITALS
WEIGHT: 196 LBS | RESPIRATION RATE: 18 BRPM | DIASTOLIC BLOOD PRESSURE: 70 MMHG | SYSTOLIC BLOOD PRESSURE: 122 MMHG | TEMPERATURE: 97.8 F | HEART RATE: 96 BPM | BODY MASS INDEX: 37.03 KG/M2

## 2018-12-10 DIAGNOSIS — J03.90 TONSILLITIS: Primary | ICD-10-CM

## 2018-12-10 PROCEDURE — 99213 OFFICE O/P EST LOW 20 MIN: CPT | Performed by: PHYSICIAN ASSISTANT

## 2018-12-10 RX ORDER — DEXTROAMPHETAMINE SACCHARATE, AMPHETAMINE ASPARTATE, DEXTROAMPHETAMINE SULFATE AND AMPHETAMINE SULFATE 1.25; 1.25; 1.25; 1.25 MG/1; MG/1; MG/1; MG/1
TABLET ORAL
Refills: 0 | COMMUNITY
Start: 2018-11-13 | End: 2019-02-18

## 2018-12-10 RX ORDER — PRAZOSIN HYDROCHLORIDE 1 MG/1
CAPSULE ORAL
Refills: 1 | COMMUNITY
Start: 2018-11-13 | End: 2019-02-18

## 2018-12-10 ASSESSMENT — PATIENT HEALTH QUESTIONNAIRE - PHQ9: SUM OF ALL RESPONSES TO PHQ QUESTIONS 1-9: 8

## 2018-12-10 ASSESSMENT — PAIN SCALES - GENERAL: PAINLEVEL: NO PAIN (1)

## 2018-12-10 NOTE — PATIENT INSTRUCTIONS
Symptoms due to virus. No antibiotic is needed at this time. Symptoms typically worsen on days 3-4 and then begin improving each day. If symptoms begin worsening or fail to improve after 10 days, return to clinic for reevaluation.     May use symptomatic care with tylenol or ibuprofen. Sudafed or mucinex work well for congestion. May use cough syrup or cough drops.  Using a humidifier works well to break up the congestion. You can also sleep propped up on a couple pillows to decrease symptoms at night.    Please take tylenol as needed up to 4 times daily.  Treat symptomatically with warm salt water gargles.  Lozenges, Tylenol, Advil or Aleve as needed. Frequent swallows of cool liquid.  Oatmeal coats the throat and some patients find it soothes the pain. Encouraged warm teas or fluids with honey.     If you have sinus congestion -  Use a Neti Pot/sinus flush (Jose Med Sinus Rinse) 3 times daily to irrigate sinuses/mucosal tissue.     Monitor for any fevers or chills. Return in 7-10 days if not feeling better. Please call clinic with any questions or concerns. Return to clinic with change/worsening of symptoms.   Encouraged fluids and rest.    Call 9-1-1 or go to the emergency room if you:  Have trouble breathing   Are drooling because you cannot swallow your saliva   Have swelling of the neck or tongue   Cannot move your neck or have trouble opening your mouth

## 2018-12-10 NOTE — NURSING NOTE
"Chief Complaint   Patient presents with     RECHECK     [facility]       Initial /70   Pulse 96   Temp 97.8  F (36.6  C) (Tympanic)   Resp 18   Wt 88.9 kg (196 lb)   LMP 11/21/2018   Breastfeeding? No   BMI 37.03 kg/m   Estimated body mass index is 37.03 kg/m  as calculated from the following:    Height as of 8/14/18: 1.549 m (5' 1\").    Weight as of this encounter: 88.9 kg (196 lb).  Medication Reconciliation: complete    Brionna Vital LPN     She [facility] for [facility] throat. Her throat is better but she is having ear pain and her scalp hurts. She says it feels like someone is pulling her hair.  Brionna Vital LPN..................12/10/2018   2:27 PM    "
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
## 2018-12-10 NOTE — TELEPHONE ENCOUNTER
"Patient calls and reports the following:    \"It is kind of strange. I had a little bit of earache when I went into the [facility]. But now my lymph nodes are swollen. My ears are painful. I am achy and sore and the top of my head is tender. I don't know if I have a temperature\".  Reviewed 12/07/18  chart note- see below-indicating patient to follow up with PCP in 3 days if not improving. Writer recommended patient schedule an OV today. Patient transferred to scheduling to set up OV for today. Please note patient is willing to see another provider who has openings.     12/07/2018-A/P:  Fever, exudate to tonsils.  Neg rapid strep, neg mono, WBC is 12.1,   Will treat as viral and wait for culture.   Discussed findings and rational with patient and she is agreeable with this.   Given Epic educational materials.  Will notify of culture results.  F/u with PCP in 3 days if not improving.   Analgesics for pain and fever.   Rest and push fluids.  ED if sx worsen.     Mana Gomez RN on 12/10/2018 at 10:38 AM    "

## 2018-12-11 ASSESSMENT — ASTHMA QUESTIONNAIRES: ACT_TOTALSCORE: 19

## 2018-12-19 ENCOUNTER — OFFICE VISIT (OUTPATIENT)
Dept: FAMILY MEDICINE | Facility: OTHER | Age: 28
End: 2018-12-19
Attending: NURSE PRACTITIONER

## 2018-12-19 VITALS
HEIGHT: 63 IN | BODY MASS INDEX: 34.46 KG/M2 | TEMPERATURE: 98.6 F | DIASTOLIC BLOOD PRESSURE: 80 MMHG | OXYGEN SATURATION: 99 % | SYSTOLIC BLOOD PRESSURE: 130 MMHG | HEART RATE: 105 BPM | WEIGHT: 194.5 LBS | RESPIRATION RATE: 20 BRPM

## 2018-12-19 DIAGNOSIS — J45.901 MILD ASTHMA WITH EXACERBATION, UNSPECIFIED WHETHER PERSISTENT: Primary | ICD-10-CM

## 2018-12-19 PROCEDURE — 99213 OFFICE O/P EST LOW 20 MIN: CPT | Performed by: NURSE PRACTITIONER

## 2018-12-19 PROCEDURE — 25000125 ZZHC RX 250: Performed by: NURSE PRACTITIONER

## 2018-12-19 PROCEDURE — 94640 AIRWAY INHALATION TREATMENT: CPT | Performed by: NURSE PRACTITIONER

## 2018-12-19 RX ORDER — IPRATROPIUM BROMIDE AND ALBUTEROL SULFATE 2.5; .5 MG/3ML; MG/3ML
3 SOLUTION RESPIRATORY (INHALATION) ONCE
Status: COMPLETED | OUTPATIENT
Start: 2018-12-19 | End: 2018-12-19

## 2018-12-19 RX ADMIN — IPRATROPIUM BROMIDE AND ALBUTEROL SULFATE 3 ML: .5; 3 SOLUTION RESPIRATORY (INHALATION) at 18:46

## 2018-12-19 ASSESSMENT — MIFFLIN-ST. JEOR: SCORE: 1577.5

## 2018-12-19 ASSESSMENT — PAIN SCALES - GENERAL: PAINLEVEL: MILD PAIN (2)

## 2018-12-20 ENCOUNTER — TELEPHONE (OUTPATIENT)
Dept: FAMILY MEDICINE | Facility: OTHER | Age: 28
End: 2018-12-20

## 2018-12-20 ASSESSMENT — ASTHMA QUESTIONNAIRES: ACT_TOTALSCORE: 16

## 2018-12-20 NOTE — PATIENT INSTRUCTIONS
Duoneb given in clinic    Use your symbicort inhaler as needed    Use your albuterol inhaler as needed    Follow up if any worsening symptoms or concerns

## 2018-12-20 NOTE — TELEPHONE ENCOUNTER
Patient was called and stated that she would like to keep her appointment with Dr. Coffey as she doesn't feel that this will be a WC issue but rather an illness, Possible URI. Arabella Orantes LPN....................  12/20/2018   10:57 AM

## 2018-12-20 NOTE — NURSING NOTE
Chief Complaint   Patient presents with     Asthma       Medication Reconciliation: complete   Patient presents with chest congestion, shortness of breath. 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 Britt Stewart LPN .............12/19/2018  6:21 PM

## 2018-12-20 NOTE — PROGRESS NOTES
HPI:    Tegan Rubio is a 28 year old female  who presents to clinic today for asthma exacerbation.    She was cleaning some shelves at work at [employer] and immediately felt that she inhaled the chemical followed by tightness in the chest and shortness of breath.  Incident occurred yesterday evening around 6 to 7 pm.  States she has mild intermittent asthma.  She has a Symbicort inhaler and Albuterol inhaler - she only uses PRN.  States she did not have her inhalers with her so she used a co-workers Symbicort inhaler at work.  Several hours later she used her own Albuterol inhaler at home last night.  No inhaler use today.   Mild cough today while using some inhaled water vaporizer.  Chest tightness, burning in chest, and shortness of breath persisting today so she came to clinic.  No fevers.  Throat with mild irritation and mild discomfort.  Current daily tobacco use - states intermittent.      Past Medical History:   Diagnosis Date     Other abnormal glucose     , ran away from home.     Other specified postprocedural states     ,koilocytosis; mild dysplasia     Personal history of other medical treatment (CODE)     Verbal     Personal history of other medical treatment (CODE)     No Comments Provided     Personal history of other medical treatment (CODE)          Past Surgical History:   Procedure Laterality Date      SECTION      12/13/10     COLPOSCOPY, BIOPSY, COMBINED           Social History     Tobacco Use     Smoking status: Current Every Day Smoker     Packs/day: 0.50     Years: 8.00     Pack years: 4.00     Types: Cigarettes     Last attempt to quit: 1/15/2016     Years since quittin.9     Smokeless tobacco: Never Used   Substance Use Topics     Alcohol use: No     Alcohol/week: 0.0 oz     Comment: occasional     Current Outpatient Medications   Medication Sig Dispense Refill     amphetamine-dextroamphetamine (ADDERALL) 5 MG tablet TK 1 T PO 2 TIMES PER DAY  0      "gabapentin (NEURONTIN) 300 MG capsule Take 300 mg by mouth 3 times daily 2 capsules at 0900  2 capsules at 1300  1 capsule at bedtime  6     LORazepam (ATIVAN) 1 MG tablet Take 1 tablet (1 mg) by mouth every 8 hours as needed for anxiety Do not operate a vehicle after taking this medication 15 tablet 0     prazosin (MINIPRESS) 1 MG capsule TK 1 C PO D AT BEDTIME AND ONE TIME DURING THE DAY IF NEEDED FOR ANXIETY  1     SYMBICORT 80-4.5 MCG/ACT Inhaler INHALE 2 PUFFS PO BID. WHILE IN PERU.  0     VENTOLIN  (90 Base) MCG/ACT Inhaler as needed   3     No Known Allergies      Past medical history, past surgical history, current medications and allergies reviewed and accurate to the best of my knowledge.        ROS:  Refer to HPI    /80 (BP Location: Left arm, Patient Position: Sitting, Cuff Size: Adult Regular)   Pulse 105   Temp 98.6  F (37  C) (Tympanic)   Resp 20   Ht 1.61 m (5' 3.39\")   Wt 88.2 kg (194 lb 8 oz)   LMP 12/16/2018   SpO2 99%   Breastfeeding? No   BMI 34.04 kg/m      EXAM:  General Appearance: Well appearing adult female, appropriate appearance for age. No acute distress  Orophayrnx: moist mucous membranes, posterior pharynx with mild erythema, tonsils without hypertrophy, mild erythema, no exudates or petechiae, no post nasal drip seen.    Neck: supple without adenopathy  Respiratory: normal chest wall and respirations.  Normal effort.  Clear to auscultation bilaterally, no wheezing, crackles or rhonchi.  No increased work of breathing.  No cough appreciated, oxygen saturation 97%.  Patient reports some improvement with Duoneb administered in clinic.  Cardiac: RRR with no murmurs  Musculoskeletal:  Normal gait.  Equal movement of bilateral upper extremities.  Equal movement of bilateral lower extremities.    Psychological: normal affect, alert and pleasant      Labs:  Not clinically indicated    Xray:  Not clinically indicated       ASSESSMENT/PLAN:  1. Mild asthma with " exacerbation, unspecified whether persistent    - ipratropium - albuterol 0.5 mg/2.5 mg/3 mL (DUONEB) neb solution 3 mL; Take 3 mLs by nebulization once  - INHALATION/NEBULIZER TREATMENT, INITIAL    Discussed warning signs/symptoms indicative of need to f/u    Follow up for recheck scheduled for 2 days, return sooner if symptoms persist or worsen or concerns

## 2019-02-15 PROBLEM — J45.20 MILD INTERMITTENT ASTHMA WITHOUT COMPLICATION: Status: ACTIVE | Noted: 2017-07-19

## 2019-02-15 PROBLEM — F33.9 DEPRESSION, MAJOR, RECURRENT (H): Status: ACTIVE | Noted: 2019-02-15

## 2019-02-18 ENCOUNTER — OFFICE VISIT (OUTPATIENT)
Dept: FAMILY MEDICINE | Facility: OTHER | Age: 29
End: 2019-02-18
Attending: PHYSICIAN ASSISTANT
Payer: COMMERCIAL

## 2019-02-18 VITALS
BODY MASS INDEX: 32.9 KG/M2 | DIASTOLIC BLOOD PRESSURE: 70 MMHG | RESPIRATION RATE: 18 BRPM | WEIGHT: 188 LBS | SYSTOLIC BLOOD PRESSURE: 122 MMHG | HEART RATE: 80 BPM | TEMPERATURE: 98.4 F

## 2019-02-18 DIAGNOSIS — A09 TRAVELER'S DIARRHEA: ICD-10-CM

## 2019-02-18 DIAGNOSIS — J45.20 MILD INTERMITTENT ASTHMA WITHOUT COMPLICATION: ICD-10-CM

## 2019-02-18 DIAGNOSIS — Z72.0 TOBACCO ABUSE: Primary | ICD-10-CM

## 2019-02-18 PROCEDURE — 99213 OFFICE O/P EST LOW 20 MIN: CPT | Performed by: PHYSICIAN ASSISTANT

## 2019-02-18 PROCEDURE — G0463 HOSPITAL OUTPT CLINIC VISIT: HCPCS

## 2019-02-18 RX ORDER — CIPROFLOXACIN 500 MG/1
500 TABLET, FILM COATED ORAL 2 TIMES DAILY
Qty: 6 TABLET | Refills: 0 | Status: SHIPPED | OUTPATIENT
Start: 2019-02-18 | End: 2019-02-21

## 2019-02-18 RX ORDER — DEXTROAMPHETAMINE SACCHARATE, AMPHETAMINE ASPARTATE, DEXTROAMPHETAMINE SULFATE AND AMPHETAMINE SULFATE 2.5; 2.5; 2.5; 2.5 MG/1; MG/1; MG/1; MG/1
20 TABLET ORAL
Refills: 0 | COMMUNITY
Start: 2019-02-18

## 2019-02-18 RX ORDER — ALBUTEROL SULFATE 90 UG/1
2 AEROSOL, METERED RESPIRATORY (INHALATION) EVERY 4 HOURS PRN
Qty: 1 INHALER | Refills: 3 | Status: SHIPPED | OUTPATIENT
Start: 2019-02-18

## 2019-02-18 RX ORDER — DEXTROAMPHETAMINE SACCHARATE, AMPHETAMINE ASPARTATE, DEXTROAMPHETAMINE SULFATE AND AMPHETAMINE SULFATE 2.5; 2.5; 2.5; 2.5 MG/1; MG/1; MG/1; MG/1
TABLET ORAL
Refills: 0 | COMMUNITY
Start: 2019-01-30 | End: 2019-02-18

## 2019-02-18 NOTE — PROGRESS NOTES
Nursing Notes:   Aparna Castillo LPN  2019 11:15 AM  Signed  Chief Complaint   Patient presents with     Medication Therapy Management     discuss medication for travel         Medication Reconciliation: complete    Aparna Castillo LPN      HPI:    Tegan Rubio is a 28 year old female who presents for medication management.  Patient is going to travel to [place] for a couple weeks in order to visit her daughter.  She previously went to [place] 1 year ago.  No complications with travel.  Patient is up-to-date on her typhoid vaccine.  Up-to-date on hepatitis a and B vaccines.  Malaria is not needed per the region that she is going to.  Patient is wondering about getting a prescription for an antibiotic in case she develops traveler's diarrhea while she is gone.  She received a prescription for an antibiotic 1 year ago however did not end up using the medication.    Patient currently smokes tobacco.  Wondering about getting a nicotine inhaler in order to quit smoking. Smoking 1/2 PPD.  No recent fever, chills, cold symptoms.    Patient has history of asthma.  He uses albuterol as needed.  Needs refill of her medication.  No side effects noted with medication.  No recent flares.  No current cough or cold symptoms.  No fevers or chills.      Past Medical History:   Diagnosis Date     Other abnormal glucose     , ran away from home.     Other specified postprocedural states     ,koilocytosis; mild dysplasia     Personal history of other medical treatment (CODE)     Verbal     Personal history of other medical treatment (CODE)     No Comments Provided     Personal history of other medical treatment (CODE)            Past Surgical History:   Procedure Laterality Date      SECTION      12/13/10     COLPOSCOPY, BIOPSY, COMBINED             Family History   Problem Relation Age of Onset     Other - See Comments Daughter         No Known Problems     Heart Disease Father          Heart Disease,Arrhythmia     Other - See Comments Sister         Psychiatric illness,Depression-currently being treated, resolved     Diabetes Mother         Diabetes     Other - See Comments Maternal Grandmother         Alzheimer's     Cancer Paternal Grandmother         Cancer     Prostate Cancer Maternal Uncle         Cancer-prostate       Social History     Tobacco Use     Smoking status: Current Every Day Smoker     Packs/day: 0.50     Years: 8.00     Pack years: 4.00     Types: Cigarettes     Last attempt to quit: 1/15/2016     Years since quitting: 3.0     Smokeless tobacco: Never Used   Substance Use Topics     Alcohol use: No     Alcohol/week: 0.0 oz     Comment: occasional       Current Outpatient Medications   Medication Sig Dispense Refill     amphetamine-dextroamphetamine (ADDERALL) 10 MG tablet Take 2 tablets (20 mg) by mouth 20 mg AM, 10 mg PM  0     ciprofloxacin (CIPRO) 500 MG tablet Take 1 tablet (500 mg) by mouth 2 times daily for 3 days 6 tablet 0     gabapentin (NEURONTIN) 300 MG capsule Take 300 mg by mouth 3 times daily 2 capsules at 0900  2 capsules at 1300  1 capsule at bedtime  6     LORazepam (ATIVAN) 1 MG tablet Take 1 tablet (1 mg) by mouth every 8 hours as needed for anxiety Do not operate a vehicle after taking this medication 15 tablet 0     nicotine (NICOTROL) 10 MG inhaler Inhale 6-16 Cartridges into the lungs daily as needed for smoking cessation (for 6-12wk, then taper dose over 6-12wk to D/C) 2 Box 11     SYMBICORT 80-4.5 MCG/ACT Inhaler INHALE 2 PUFFS PO BID. WHILE IN PERU.  0     VENTOLIN  (90 Base) MCG/ACT inhaler Inhale 2 puffs into the lungs every 4 hours as needed for shortness of breath / dyspnea or wheezing 1 Inhaler 3       No Known Allergies    REVIEW OF SYSTEMS:  Refer to HPI.    EXAM:   Vitals:    /70 (BP Location: Right arm, Patient Position: Sitting, Cuff Size: Adult Regular)   Pulse 80   Temp 98.4  F (36.9  C)   Resp 18   Wt 85.3 kg (188 lb)   BMI  32.90 kg/m      General Appearance: Pleasant, alert, appropriate appearance for age. No acute distress  Chest/Respiratory Exam: Normal chest wall and respirations. Clear to auscultation.  Cardiovascular Exam: Regular rate and rhythm. S1, S2, no murmur, click, gallop, or rubs.  Skin: no rash or abnormalities  Psychiatric Exam: Alert and oriented - appropriate affect.    PHQ Depression Screen  PHQ-9 SCORE 9/21/2017 1/22/2018 12/10/2018   PHQ-9 Total Score 10 1 8       ASSESSMENT AND PLAN:      ICD-10-CM    1. Tobacco abuse Z72.0 nicotine (NICOTROL) 10 MG inhaler   2. Mild intermittent asthma without complication J45.20 VENTOLIN  (90 Base) MCG/ACT inhaler   3. Traveler's diarrhea A09 ciprofloxacin (CIPRO) 500 MG tablet         Tobacco abuse: Patient was given a nicotine inhaler to use for smoking cessation.  Patient was given patient education.  Highly encouraged to quit smoking.    Asthma: Refilled albuterol inhaler.  No acute concerns at this time.  Recheck as needed.    Traveler's diarrhea: No acute infection concerns at this time.  Patient was given pros versus cons of starting an antibiotic while she is traveling for traveler's diarrhea.  Patient was given ciprofloxacin 500 mg to take twice daily for 3 days in case she develops diarrhea while she is away.  Patient is to seek medical attention if she develops fevers, chills, abdominal pain, blood in stool, persistent or worsening diarrhea.    Courtney Ascencio PA-C..................2/18/2019 10:43 AM

## 2019-02-18 NOTE — PATIENT INSTRUCTIONS
Patient Education     The Benefits of Living Smoke Free  What do you want to gain from quitting? Check off some reasons to quit.  Health benefits  ___  Improve my ability to breathe without coughing or shortness of breath  ___  Reduce my risk of lung cancer, heart disease, chronic lung disease  ___  Have fewer wrinkles and softer skin  ___  Improve my sense of taste and smell  ___  For pregnant women--reduce the risk of having a miscarriage, stillbirth, premature birth, or low-birth-weight baby  Personal benefits  ___  Feel more in control of my life  ___  Have better-smelling hair, breath, clothes, home, and car  ___  Save time by not having to take smoke breaks, buy cigarettes, or hunt for a light  ___  Have whiter teeth  Family benefits  ___  Reduce my children s respiratory tract infections  ___  Set a good example for my children  ___  Reduce my family s cancer risk  Financial benefits  ___  Save hundreds of dollars each year that would be spent on cigarettes  ___  Save money on medical bills  ___  Save on life, health, and car insurance premiums     Those dollars add up!  Cigarettes are expensive, and getting more expensive all the time. Do you realize how much money you are spending on cigarettes per year? What is the average amount you spend on a pack of cigarettes? What is the average number of packs that you smoke per day? Using your answers to these questions, fill in this formula to help you find out:  ($ _____ per pack) ×  ( _____ number of packs per day) × (365 days) =  $ _____ yearly cost of smoking  Besides tobacco, there are other costs, including extra cleaning bills and replacement costs for clothing and furniture; medical expenses for smoking-related illnesses; and higher health, life, and car insurance premiums.  Cigars and pipes count too!  Cigars and pipes are also dangerous. So are smokeless (chewing) tobacco and snuff. All of these products contain nicotine, a highly addictive substance  that has harmful effects on your body. Quitting smoking means giving up all tobacco products.      For more information    https://smokefree.gov/zksd-xg-jx-expert    [facility] Smoking Quitline: 877-44U-QUIT (574-821-9750)   Date Last Reviewed: 2/1/2017 2000-2018 The "[employer] [address] [place] [address]. All rights reserved. This information is not intended as a substitute for professional medical care. Always follow your healthcare professional's instructions.           Patient Education     Why Do You Smoke?  The more you know about why you smoke, the easier it will be to quit. You may reach for a cigarette during a stressful commute. Or you may want to smoke when you first wake up in the morning. Learn what your smoking triggers are, and how to handle them.    Common triggers    Frustration    Fatigue    Anger    Stress    Hunger    Boredom or loneliness    Drinking or socializing    Watching others smoke    Smelling cigarette smoke  Track your smoking habits  To learn about your smoking habits, track them for a week. Attach a small notebook or piece of paper to your cigarette pack. With each cigarette you smoke, write down the time, where you are, who you re with, and how you feel.  How to cope with your triggers    Change the habits that lead you to smoke. For instance, if you often smoke at a morning break, go for a walk instead.    Distract yourself from smoking. Keep your hands busy by playing with a paper clip or doodling. Keep your mouth busy by chewing on gum or a carrot stick.    Limit contact with people who are smoking. When you eat out, sit in the nonsmoking section.     For more information    https://smokefree.gov/jmnq-dc-dd-expert    [facility] Smoking Quitline: 877-44U-QUIT (557-523-5827)      Date Last Reviewed: 2/1/2017 2000-2018 The "[employer] [address] [place] [address]. All rights reserved. This  information is not intended as a substitute for professional medical care. Always follow your healthcare professional's instructions.           Patient Education     Staying Smoke-Free     Deep breathing can help ease the urge to smoke.     Quitting smoking is a big change. People will congratulate you. You have the right to be proud. But later at times you may miss smoking. Plan ahead to resist temptation.  Prepare to be tempted    If you feel the urge to smoke, distract yourself for about 5 minutes. Drink water. Call a friend, walk around the room, or try deep breathing. Usually, the urge to smoke will pass.    Don t trust yourself to have  just one cigarette.  Many ex-smokers get hooked again that way.    Remind yourself why you quit. Tell yourself you can stay quit.    Avoid people or places that can trigger you to smoke. Ask others not to smoke in your home or car.    Spend time in places where you can t smoke-- a museum, a library, a store, or a gym.    Take your nonsmoking life one day at a time. Pavan each day on your calendar.    HALT your desire. Keep yourself from feeling too Hungry, Angry, Lonely, or Tired. Deal with your real needs. Eat, talk, or sleep.    Put aside cigarette money and reward yourself.  If you slip  You may slip and smoke again. Many ex-smokers slip on the way to success. If you do, it s not the end of your quit process. Think about what triggered you to smoke. Then think of ways to prevent future slips. Ask yourself what you can learn from the slip. Decide how you will handle this trigger better in the future. Then get back on track--right away!  Don t give up  Keep telling yourself you re no longer a smoker. Don t lose hope. Most people have tried to quit several times before being successful. Try to stay focused on your plan to be smoke-free. Keep in mind all the benefits of staying quit. Millions of people have given up smoking. You can too.        For more  information    https://smokefree.gov/oysw-vn-dj-expert    [facility] Smoking Quitline: 877-44U-QUIT (659-849-1308)      Date Last Reviewed: 2/1/2017 2000-2018 The [employer]. [address] [place] [address]. All rights reserved. This information is not intended as a substitute for professional medical care. Always follow your healthcare professional's instructions.           Patient Education     How to Quit Smoking  Smoking is one of the hardest habits to break. About half of all people who have ever smoked have been able to quit. Most people who still smoke want to quit. Here are some of the best ways to stop smoking.    Keep trying  Most smokers make many attempts at quitting before they are successful. It s important not to give up.  Go cold turkey  Most former smokers quit cold turkey (all at once). Trying to cut back gradually doesn't seem to work as well, perhaps because it continues the smoking habit. Also, it is possible to inhale more while smoking fewer cigarettes. This results in the same amount of nicotine in your body.  Get support  Support programs can be a big help, especially for heavy smokers. These groups offer lectures, ways to change behavior, and peer support. Here are some ways to find a support program:    Free national quitline: 800-QUIT-NOW (884-307-3953).    [facility] quit-smoking programs.    American Lung Association: (358.261.9502).    American Cancer Society (350-174-2016).  Support at home is important too. Nonsmokers can offer praise and encouragement. If the smoker in your life finds it hard to quit, encourage them to keep trying.  Over-the-counter medicines  Nicotine replacement therapy may make quitting easier. Certain aids, such as the nicotine patch, gum, and lozenges, are available without a prescription. It is best to use these under a doctor s care, though. The skin patch provides a steady supply of nicotine. Nicotine gum and lozenges  "give temporary bursts of low levels of nicotine. Both methods reduce the craving for cigarettes. Warning: If you have nausea, vomiting, dizziness, weakness, or a fast heartbeat, stop using these products and see your doctor.  Prescription medicines  After reviewing your smoking patterns and past attempts to quit, your doctor may offer a prescription medicine such as bupropion, varenicline, a nicotine inhaler, or nasal spray. Each has advantages and side effects. Your doctor can review these with you.  Health benefits of quitting  The benefits of quitting start right away and keep improving the longer you go without smoking. These benefits occur at any age.  So whether you are 17 or 70, quitting is a good decision. Some of the benefits include:    20 minutes: Blood pressure and pulse return to normal.    8 hours: Oxygen levels return to normal.    2 days: Ability to smell and taste begin to improve as damaged nerves regrow.    2 to 3 weeks: Circulation and lung function improve.    1 to 9 months: Coughing, congestion, and shortness of breath decrease; tiredness decreases.    1 year: Risk of heart attack decreases by half.    5 years: Risk of lung cancer decreases by half; risk of stroke becomes the same as a nonsmoker s.  For more on how to quit smoking, try these online resources:     Smokefree.gov    \"Clearing the Air\" booklet from the National [facility] [place]: smokefree.gov/sites/default/files/pdf/clearing-the-air-accessible.pdf  Date Last Reviewed: 3/1/2017    8789-4929 [employer]. [address] [place] [address]. All rights reserved. This information is not intended as a substitute for professional medical care. Always follow your healthcare professional's instructions.           Patient Education     Coping with Smoking Withdrawal    For the first few days after you quit smoking, you may feel cranky, restless, depressed, or low on energy. These are symptoms of withdrawal. Your body " needs time to recover from smoking. Your symptoms should lessen within a few days.  Coping with the urge to smoke    Deep-breathe. Inhale through your nose. Count to 5. Slowly exhale through your mouth.    Drink water. Try to drink 8 or more 8-ounce glasses of water a day.    Keep your hands busy. Wash your car. Draw. Do a puzzle. Build a [employer].    Delay. The urge to smoke lasts only 3 to 5 minutes.  Get support  Individual, group, and telephone counseling can help keep you on track. Ask your healthcare provider for more information about resources available to you.  Control stress  After you quit, you may feel irritable and stressed. Try taking a warm bath or shower. Listen to music. Go for a walk or to the gym. Try yoga or meditate. Call friends or talk with a professional.  Exercise  Exercise helps your body and mind feel better. There are many ways to be more active. Find something you enjoy doing. See if a friend will join you for a walk or a bike ride.  Sleep better  You may feel tired but have trouble falling asleep. Try to relax before bed. Do a few stretching exercises. Read for a while. Also, avoid caffeine for at least a few hours before bedtime.  Get fit, not fat  You may notice an increased appetite. Many people who quit smoking gain a few pounds. To limit weight gain, try to watch what you eat. Cut back on fat in your diet. Snack on low-calorie foods, like fresh fruits and vegetables. Drink low-calorie liquids, especially water. Regular exercise can also help you stay fit. And remember: Your main goal is to be a nonsmoker. Stay focused on that goal.  Quit-smoking products  There are a number of products that can help you quit smoking including medicines and nicotine replacement products. They are available over-the-counter or by prescription. Ask your healthcare provider if any of these could help you quit smoking.        For more information    https://smokefree.gov/xwma-ft-bc-expert    National  [facility] [place] Smoking Quitline: 877-44U-QUIT (399-243-0922)   Date Last Reviewed: 2/1/2017 2000-2018 [employer]. [address] [place] [address], [place] [address]. All rights reserved. This information is not intended as a substitute for professional medical care. Always follow your healthcare professional's instructions.           Patient Education     Getting Support for Quitting Smoking    You don t have to go through the process of quitting smoking without support. Tell people you are quitting. The support of friends, coworkers, and family members can make a big difference. Face-to-face or telephone counseling can also be helpful, as can a stop-smoking class or an ex-smokers  group.  Set a quit date  If you re serious about quitting smoking, choose a date within the next 2 to 4 weeks. Pavan it in bright, bold letters on a calendar you use often. Tell people about your quit date. Ask for their support. Let your friends and family know how they can help you quit.  Make a contract  A quit-smoking contract gives you a goal. Write out the contract and sign it. Have it witnessed, if you like. Then keep the contract where you ll see it often, or carry it with you. Read the contract when you re tempted to smoke.  Take action  On the day you quit, reread your quit contract. Think about the benefits you gain by quitting, such as better health and an improved sense of taste.    Remove cigarettes from your home, car, or any other place where you stash them.    Throw away all smoking materials, including matches, lighters, and ashtrays.    Review your list of triggers and your plan for coping with them.    Stay away from people or settings you link with smoking.    Make a survival kit that includes gum, mints, carrot sticks, and things to keep your hands busy.    Talk to your healthcare provider about using quit-smoking products, such as medication or a nicotine patch, inhaler, nasal spray, gum, or lozenges.  Ask  for help  Sometimes you may just need to talk when you miss smoking. Ex-smokers are good to talk to, because they re likely to know how you feel. You may need extra support in the first few weeks after you quit. Ask a friend to call you each day to see how you re doing. Telephone counseling can also help you keep on track. Ask your healthcare provider, local hospital, or public health department to put you in touch with a phone counselor. You may also have to deal with doubters when you decide to quit. Explain to any doubters why you are quitting. Tell them that quitting is important to you. Ask for their support. Tell your smoking buddies that you can walk together instead of smoking together. If someone thinks you won t succeed, say that you have a good quit plan and ask for their support. Let him or her know you re sticking with it.     For more information    https://smokefree.gov/yvkn-vf-uk-expert    National [facility] [place] Smoking Quitline: 877-44U-QUIT (131-991-4403)   Date Last Reviewed: 2/1/2017 2000-2018 [employer]. [address] [place] [address]. All rights reserved. This information is not intended as a substitute for professional medical care. Always follow your healthcare professional's instructions.           Patient Education     Planning to Quit Smoking  Your healthcare provider may have told you that you need to give up tobacco. Only you can decide if and when you are ready to quit. Quitting is hard to do. But the benefits will be worth it. When you  decide to quit, come up with a plan that s right for you. Discuss your plan with your healthcare provider. And talk to your provider about medicines to help you quit.    Line up support  To quit smoking, you ll need a plan and some help. Pick a date within the next 2 to 4 weeks to quit. Use the time between now and that date to arrange for support.    Classes and counselors. Quit-smoking classes  people like you  through the process. Get to know others in a class, and support each other beyond the class. Telephone counseling also helps you keep on track. Ask your healthcare provider, local hospital, or public health department to put you in touch with a class and a phone counselor.    Family and friends. Tell your family and friends about your quit date. Ask them to support your change. If they smoke, arrange to see them in smoke-free places. Forbid smoking in your home and car.     Finding something to replace cigarettes may be hard to do. Be aware that some things you choose may be as harmful as cigarettes:    Smokeless (chewing) tobacco is just as harmful as regular tobacco. Tobacco should not be used as a substitute for cigarettes.    Herbal medicines or teas may affect how your body handles nicotine. Talk to your healthcare provider before using these products.    E-cigarettes have less toxins than the smoke from a regular cigarette. But the FDA says that these devices may still have substances that can cause cancer. E-cigarettes are not well regulated. They have not been studied enough to know if they are a good aid to help you stop smoking. Talk with your healthcare provider before using these products.      Quit-smoking products  Many products can help you quit smoking. Some are prescription medicines that help curb your cravings and withdrawal symptoms. Other products slowly lessen the level of nicotine your body absorbs. Nicotine is the highly addictive substance found in cigarettes, cigars, and chewing tobacco. Nicotine replacement products can help get your body used to slowly decreasing amounts of nicotine after you quit smoking. These products include a nicotine patch, gum, lozenge, nasal spray, and inhaler. Be sure you follow the directions for your medicine or product carefully. Your healthcare provider may tell you to start taking the prescription medicine a week before you plan to quit. Do not smoke while  you use nicotine products. Doing so can be very harmful to your health.  For more information    https://smokefree.gov/qzcm-ue-sm-expert    National [facility] Smoking Quitline: 877-44U-QUIT (932-145-4140)   Date Last Reviewed: 2/1/2017 2000-2018 [employer]. [address] [place] [address]. All rights reserved. This information is not intended as a substitute for professional medical care. Always follow your healthcare professional's instructions.           Patient Education     Tips for Quitting Smoking (Cardiovascular)  Quitting smoking is a gift to yourself, one of the best things you can do to keep your heart disease from getting worse. Smoking reduces oxygen flow to your heart by speeding the buildup of plaque and changing the health of your blood vessels. This increases your risk for heart attack, also known as acute myocardial infarction, or AMI. Quitting helps reduce smoking's harmful effects. You may have tried to quit before, but don t give up. Try again. Many smokers try 4 or 5 times before they succeed. It is never too early to benefit from smoking cessation, especially if you already have chronic conditions such as high blood pressure and high cholesterol that put you at increased risk for cardiovascular disease.     You ll have the best chance of success if you join a stop-smoking group and have the support of your doctor, family and friends.     Line up help    Ask for the support of your family and friends.    Join a smoking cessation class, or ask your healthcare provider for a referral to a psychologist who specializes in helping people quit smoking.     Ask your healthcare provider about nicotine replacement products and prescription medicines that can help you quit.  Set a quit date    Choose a date within the next 2 to 4 weeks.    After picking a day, liam it in bold letters on a calendar.     Your quit list  Ideas to stop smoking include:  1. Start by giving up  cigarettes at the times you least need them.  2. Keeping a piece of fruit close by at the times you are most vulnerable to reach for a cigarette.  3. Using a nicotine replacement product instead of a cigarette.  Write down a few more ideas.     Set limits    Limit where you can smoke. Pick one room or a porch, and smoke only in that place.    Make smoking outdoors a house rule. Other smokers won t tempt you as much.    Speak to smokers around you about your intent to stop smoking so they can show consideration for you and limit their smoking around you.    Hang a list of   quit benefits  in the spot where you smoke. Put one on the refrigerator and one on your car dashboard.     For more information    smokefree.gov/mflr-vi-vm-expert    National [facility] [place] Smoking Quitline: 877-44U-QUIT (418-152-4661)      Date Last Reviewed: 3/26/2016    5478-5286 [employer]. [address] [place] [address], [place] [address]. All rights reserved. This information is not intended as a substitute for professional medical care. Always follow your healthcare professional's instructions.

## 2019-02-18 NOTE — NURSING NOTE
Chief Complaint   Patient presents with     Medication Therapy Management     discuss medication for travel         Medication Reconciliation: complete    Aparna Castillo, LPN

## 2019-05-29 ENCOUNTER — TELEPHONE (OUTPATIENT)
Dept: FAMILY MEDICINE | Facility: OTHER | Age: 29
End: 2019-05-29

## 2019-05-29 NOTE — TELEPHONE ENCOUNTER
NA, please ask ACT questions if calls back.  Oralia Castillo LPN ...... 5/29/2019 10:58 AM      Panel Management Review      Patient has the following on her problem list:     Asthma review     ACT Total Scores 12/19/2018   ACT TOTAL SCORE (Goal Greater than or Equal to 20) 16   In the past 12 months, how many times did you visit the emergency room for your asthma without being admitted to the hospital? 0   In the past 12 months, how many times were you hospitalized overnight because of your asthma? 0      1. Is Asthma diagnosis on the Problem List? Yes    2. Is Asthma listed on Health Maintenance? Yes    3. Patient is due for:  ACT      Composite cancer screening  Chart review shows that this patient is due/due soon for the following None  Summary:    Patient is due/failing the following:   ACT    Action needed:   Patient needs to do ACT.    Type of outreach:    Phone, left message for patient to call back.     Questions for provider review:    None                                                                                                                                    Oralia SALDIVAR    .

## 2019-06-11 NOTE — TELEPHONE ENCOUNTER
Left message to call back, unable to reach after 3 attempts.....................Oralia Castillo LPN  6/11/2019   3:35 PM

## 2019-06-17 ENCOUNTER — TELEPHONE (OUTPATIENT)
Dept: FAMILY MEDICINE | Facility: OTHER | Age: 29
End: 2019-06-17

## 2019-06-17 NOTE — TELEPHONE ENCOUNTER
Patient completed asthma questionnaire.  Patient scored 13.  Per protocol will route to provider to review.  Latonia Ortiz LPN 6/17/2019   11:49 AM

## 2019-06-17 NOTE — TELEPHONE ENCOUNTER
Patient declines an appointment right now as she currently does not have insurance.  Latonia Ortiz LPN 6/17/2019   1:06 PM

## 2019-06-17 NOTE — TELEPHONE ENCOUNTER
Needs to set up an appointment to discuss her asthma symptoms and medications.   Courtney Ascencio PA-C..................6/17/2019 12:05 PM

## 2019-06-18 ASSESSMENT — ASTHMA QUESTIONNAIRES: ACT_TOTALSCORE: 13

## 2020-03-26 NOTE — ED NOTES
Pt taking a taxi to [employer] to  rx and meet her  so he can take pt to [facility]. Pt agrees with disposition.   Arm band on/IV intact
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
